# Patient Record
Sex: FEMALE | Race: WHITE | NOT HISPANIC OR LATINO | Employment: STUDENT | URBAN - METROPOLITAN AREA
[De-identification: names, ages, dates, MRNs, and addresses within clinical notes are randomized per-mention and may not be internally consistent; named-entity substitution may affect disease eponyms.]

---
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
## 2023-01-06 ENCOUNTER — OFFICE VISIT (OUTPATIENT)
Dept: GASTROENTEROLOGY | Facility: CLINIC | Age: 18
End: 2023-01-06
Payer: COMMERCIAL

## 2023-01-06 VITALS
TEMPERATURE: 97.5 F | HEART RATE: 92 BPM | BODY MASS INDEX: 39.43 KG/M2 | HEIGHT: 68 IN | DIASTOLIC BLOOD PRESSURE: 83 MMHG | SYSTOLIC BLOOD PRESSURE: 119 MMHG | WEIGHT: 260.2 LBS

## 2023-01-06 DIAGNOSIS — K21.9 GASTROESOPHAGEAL REFLUX DISEASE, UNSPECIFIED WHETHER ESOPHAGITIS PRESENT: ICD-10-CM

## 2023-01-06 DIAGNOSIS — K58.8 OTHER IRRITABLE BOWEL SYNDROME: ICD-10-CM

## 2023-01-06 DIAGNOSIS — K31.84 GASTROPARESIS: Primary | ICD-10-CM

## 2023-01-06 PROCEDURE — 99214 OFFICE O/P EST MOD 30 MIN: CPT | Performed by: NURSE PRACTITIONER

## 2023-01-06 RX ORDER — ONDANSETRON 4 MG/1
4 TABLET, FILM COATED ORAL EVERY 8 HOURS PRN
Qty: 25 TABLET | Refills: 3 | Status: SHIPPED | OUTPATIENT
Start: 2023-01-06

## 2023-01-06 RX ORDER — AZITHROMYCIN 200 MG/5ML
200 POWDER, FOR SUSPENSION ORAL DAILY
Qty: 150 ML | Refills: 11 | Status: SHIPPED | OUTPATIENT
Start: 2023-01-06

## 2023-01-06 RX ORDER — ZONISAMIDE 100 MG/1
200 CAPSULE ORAL DAILY
COMMUNITY
Start: 2022-12-23 | End: 2023-02-01

## 2023-01-06 NOTE — PROGRESS NOTES
Chief Complaint   Patient presents with   • New diagnosis of gastroparesis       HPI    Wendy Marks is a  17 y.o. female here for a follow up visit for abdominal pain and nausea.    This patient follows with Dr. Tapia and myself.    History of irritable bowel syndrome and GERD.  Gastric emptying study abnormal with increased gastric retention at 2 hours, 3 hours and 4 hours.  4-hour retention measuring 38%.    Today she has been meeting with a dietitian and working on gastroparesis diet which has helped.  She still has residual abdominal discomfort, gas, bloating, belching, and feelings of early satiety which are worse in the late evening.  She is taking omeprazole 40 mg once daily and feels like this helps control her heartburn.  No dysphagia or odynophagia.    Denies diarrhea, constipation, rectal bleeding.    Past Medical History:   Diagnosis Date   • Anemia    • Ankylosing spondylitis (HCC)    • Anxiety    • Chronic constipation    • Chronic diarrhea    • Chronic low back pain    • COVID-19 Jan 21   • Depression    • Endometriosis    • GERD (gastroesophageal reflux disease)    • History of anemia    • Hyperlipidemia    • IBS (irritable bowel syndrome)    • Migraine     with aura   • PCOS (polycystic ovarian syndrome)    • PONV (postoperative nausea and vomiting)        Past Surgical History:   Procedure Laterality Date   • ABDOMINAL SURGERY     • APPENDECTOMY     • EPIDURAL Bilateral 04/21/2021    Procedure: LUMBAR/SACRAL TRANSFORAMINAL EPIDURAL - likely bilateral at L5;  Surgeon: Rhett Gutierrez MD;  Location: [facility] MAIN OR;  Service: Pain Management;  Laterality: Bilateral;   • EPIDURAL Bilateral 12/10/2021    Procedure: Bilateral L5 LUMBAR/SACRAL TRANSFORAMINAL EPIDURAL;  Surgeon: Rhett Gutierrez MD;  Location: [facility] MAIN OR;  Service: Pain Management;  Laterality: Bilateral;   • EPIDURAL Bilateral 07/20/2022    Procedure: Bilateral L5 LUMBAR/SACRAL TRANSFORAMINAL EPIDURAL;  Surgeon: Brenda  Rhett JUDGE MD;  Location: [facility] MAIN OR;  Service: Pain Management;  Laterality: Bilateral;   • EPIDURAL Bilateral 12/01/2022    Procedure: LUMBAR/SACRAL TRANSFORAMINAL EPIDURAL BILAT L5--THERAPEUTIC;  Surgeon: Rhett Gutierrez MD;  Location: [facility] MAIN OR;  Service: Pain Management;  Laterality: Bilateral;   • GYNECOLOGY EXAM UNDER ANESTHESIA N/A 03/09/2021    Procedure: GYNECOLOGY EXAM UNDER ANESTHESIAl, removal of Mirena intrauterine device and placement of Kyleena intrauterine device;  Surgeon: Josephine Cummins MD;  Location: [facility] OR;  Service: Obstetrics/Gynecology;  Laterality: N/A;  GYNECOLOGY EXAM UNDER ANESTHESIA  REMOVAL OF MIRENA INTRAUTERINE DEVICE AND PLACEMENT OF KYLEENA INTRAUTERINE DEVICE   • MEDIAL BRANCH BLOCK Bilateral 06/09/2022    Procedure: Bilateral L4-S1 MEDIAL BRANCH BLOCK;  Surgeon: Rhett Gutierrez MD;  Location: [facility] MAIN OR;  Service: Pain Management;  Laterality: Bilateral;   • PELVIC LAPAROSCOPY      dx lsc endometriosis       Scheduled Meds:     Continuous Infusions: No current facility-administered medications for this visit.      PRN Meds:     No Known Allergies    Social History     Socioeconomic History   • Marital status: Single   Tobacco Use   • Smoking status: Never   • Smokeless tobacco: Never   Vaping Use   • Vaping Use: Never used   Substance and Sexual Activity   • Alcohol use: Never   • Drug use: Not Currently     Types: Marijuana     Comment: 1-2 times monthly   • Sexual activity: Never     Birth control/protection: I.U.D.     Comment: Mirena 12/2019       Family History   Problem Relation Age of Onset   • Polycystic ovary syndrome Maternal Aunt    • Hyperthyroidism Maternal Aunt    • Liver disease Maternal Aunt    • Irritable bowel syndrome Mother    • Depression Mother    • Diabetes Father    • Pancreatitis Father    • Hyperthyroidism Sister    • Skin cancer Sister    • Colon polyps Sister    • Skin cancer Maternal Uncle    • Diabetes Maternal Grandfather     • Diabetes Paternal Grandfather    • Leukemia Paternal Grandfather    • Breast cancer Neg Hx    • Ovarian cancer Neg Hx    • Colon cancer Neg Hx    • Deep vein thrombosis Neg Hx    • Pulmonary embolism Neg Hx    • Malig Hyperthermia Neg Hx        Review of Systems   Constitutional: Negative for activity change, appetite change, fatigue, fever and unexpected weight change.   HENT: Negative for trouble swallowing.    Respiratory: Negative for apnea, cough, choking, chest tightness, shortness of breath and wheezing.    Cardiovascular: Negative for chest pain, palpitations and leg swelling.   Gastrointestinal: Positive for nausea. Negative for abdominal distention, abdominal pain, anal bleeding, blood in stool, constipation, diarrhea, rectal pain and vomiting.       Vitals:    01/06/23 1126   BP: 119/83   Pulse: 92   Temp: 97.5 °F (36.4 °C)       Physical Exam  Constitutional:       Appearance: She is well-developed.   Abdominal:      General: Bowel sounds are normal. There is no distension.      Palpations: Abdomen is soft. There is no mass.      Tenderness: There is no abdominal tenderness. There is no guarding.      Hernia: No hernia is present.   Skin:     General: Skin is warm and dry.      Capillary Refill: Capillary refill takes less than 2 seconds.   Neurological:      Mental Status: She is alert and oriented to person, place, and time.   Psychiatric:         Behavior: Behavior normal.     Assessment    Diagnoses and all orders for this visit:    1. Gastroparesis (Primary)    2. Gastroesophageal reflux disease, unspecified whether esophagitis present    3. Other irritable bowel syndrome    Other orders  -     azithromycin (Zithromax) 200 MG/5ML suspension; Take 5 mL by mouth Daily. Treatment for gastroparesis  Dispense: 150 mL; Refill: 11  -     ondansetron (ZOFRAN) 4 MG tablet; Take 1 tablet by mouth Every 8 (Eight) Hours As Needed for Nausea or Vomiting.  Dispense: 25 tablet; Refill: 3        Plan    Pleasant 17-year-old female seen today for continued nausea and early satiety with some improvement on gastroparesis diet after gastric emptying study was found to be abnormal.  We discussed her GES results and all questions were answered.      Recommend she continue PPI therapy and dietary modifications as well as continue to follow along with nutritional services.  We discussed trial of azithromycin to help with digestion and she is amendable.  Continue as needed Zofran.    Additionally recommend SIBO breath testing as patients with gastroparesis are prone to bacterial imbalances.      RTC 3 months or sooner if needed.          LUIS Malave  [facility]  [address] [place] [address]  Office: [phone]

## 2023-01-10 ENCOUNTER — TELEPHONE (OUTPATIENT)
Dept: GASTROENTEROLOGY | Facility: CLINIC | Age: 18
End: 2023-01-10
Payer: COMMERCIAL

## 2023-01-10 NOTE — TELEPHONE ENCOUNTER
----- Message from LUIS Malave sent at 1/6/2023 12:00 PM EST -----  Needs SIBO breath test kit mailed to her home

## 2023-01-23 ENCOUNTER — HOSPITAL ENCOUNTER (EMERGENCY)
Facility: HOSPITAL | Age: 18
Discharge: HOME | End: 2023-01-23
Attending: EMERGENCY MEDICINE | Admitting: EMERGENCY MEDICINE
Payer: COMMERCIAL

## 2023-01-23 VITALS
RESPIRATION RATE: 20 BRPM | WEIGHT: 250 LBS | OXYGEN SATURATION: 97 % | DIASTOLIC BLOOD PRESSURE: 103 MMHG | TEMPERATURE: 98.6 F | BODY MASS INDEX: 37.89 KG/M2 | HEART RATE: 95 BPM | HEIGHT: 68 IN | SYSTOLIC BLOOD PRESSURE: 140 MMHG

## 2023-01-23 DIAGNOSIS — R20.2 PARESTHESIA: Primary | ICD-10-CM

## 2023-01-23 LAB
ALBUMIN SERPL-MCNC: 4.5 G/DL (ref 3.5–5.2)
ALBUMIN/GLOB SERPL: 1.4 G/DL
ALP SERPL-CCNC: 70 U/L (ref 39–117)
ALT SERPL W P-5'-P-CCNC: 19 U/L (ref 1–33)
ANION GAP SERPL CALCULATED.3IONS-SCNC: 11 MMOL/L (ref 5–15)
AST SERPL-CCNC: 18 U/L (ref 1–32)
BASOPHILS # BLD AUTO: 0.04 10*3/MM3 (ref 0–0.2)
BASOPHILS NFR BLD AUTO: 0.5 % (ref 0–1.5)
BILIRUB SERPL-MCNC: 0.2 MG/DL (ref 0–1.2)
BUN SERPL-MCNC: 8 MG/DL (ref 6–20)
BUN/CREAT SERPL: 11.6 (ref 7–25)
CALCIUM SPEC-SCNC: 9.6 MG/DL (ref 8.6–10.5)
CHLORIDE SERPL-SCNC: 105 MMOL/L (ref 98–107)
CO2 SERPL-SCNC: 20 MMOL/L (ref 22–29)
CREAT SERPL-MCNC: 0.69 MG/DL (ref 0.57–1)
DEPRECATED RDW RBC AUTO: 42.2 FL (ref 37–54)
EGFRCR SERPLBLD CKD-EPI 2021: 128.4 ML/MIN/1.73
EOSINOPHIL # BLD AUTO: 0.07 10*3/MM3 (ref 0–0.4)
EOSINOPHIL NFR BLD AUTO: 0.9 % (ref 0.3–6.2)
ERYTHROCYTE [DISTWIDTH] IN BLOOD BY AUTOMATED COUNT: 13 % (ref 12.3–15.4)
GLOBULIN UR ELPH-MCNC: 3.3 GM/DL
GLUCOSE SERPL-MCNC: 112 MG/DL (ref 65–99)
HCT VFR BLD AUTO: 44.4 % (ref 34–46.6)
HGB BLD-MCNC: 14.7 G/DL (ref 12–15.9)
IMM GRANULOCYTES # BLD AUTO: 0.03 10*3/MM3 (ref 0–0.05)
IMM GRANULOCYTES NFR BLD AUTO: 0.4 % (ref 0–0.5)
LYMPHOCYTES # BLD AUTO: 2.69 10*3/MM3 (ref 0.7–3.1)
LYMPHOCYTES NFR BLD AUTO: 35.1 % (ref 19.6–45.3)
MCH RBC QN AUTO: 29.4 PG (ref 26.6–33)
MCHC RBC AUTO-ENTMCNC: 33.1 G/DL (ref 31.5–35.7)
MCV RBC AUTO: 88.8 FL (ref 79–97)
MONOCYTES # BLD AUTO: 0.3 10*3/MM3 (ref 0.1–0.9)
MONOCYTES NFR BLD AUTO: 3.9 % (ref 5–12)
NEUTROPHILS NFR BLD AUTO: 4.53 10*3/MM3 (ref 1.7–7)
NEUTROPHILS NFR BLD AUTO: 59.2 % (ref 42.7–76)
NRBC BLD AUTO-RTO: 0 /100 WBC (ref 0–0.2)
PLATELET # BLD AUTO: 358 10*3/MM3 (ref 140–450)
PMV BLD AUTO: 10.3 FL (ref 6–12)
POTASSIUM SERPL-SCNC: 4.5 MMOL/L (ref 3.5–5.2)
PROT SERPL-MCNC: 7.8 G/DL (ref 6–8.5)
RBC # BLD AUTO: 5 10*6/MM3 (ref 3.77–5.28)
SODIUM SERPL-SCNC: 136 MMOL/L (ref 136–145)
WBC NRBC COR # BLD: 7.66 10*3/MM3 (ref 3.4–10.8)

## 2023-01-23 PROCEDURE — 85025 COMPLETE CBC W/AUTO DIFF WBC: CPT | Performed by: PHYSICIAN ASSISTANT

## 2023-01-23 PROCEDURE — 99283 EMERGENCY DEPT VISIT LOW MDM: CPT

## 2023-01-23 PROCEDURE — 80053 COMPREHEN METABOLIC PANEL: CPT | Performed by: PHYSICIAN ASSISTANT

## 2023-01-23 RX ORDER — SODIUM CHLORIDE 0.9 % (FLUSH) 0.9 %
10 SYRINGE (ML) INJECTION AS NEEDED
Status: DISCONTINUED | OUTPATIENT
Start: 2023-01-23 | End: 2023-01-23 | Stop reason: HOSPADM

## 2023-01-23 NOTE — ED PROVIDER NOTES
EMERGENCY DEPARTMENT ENCOUNTER    Room Number:  01/01  Date of encounter:  1/23/2023  PCP: Gerson Neville MD  Historian: Patient  Full history not obtainable due to: None    HPI:  Chief Complaint: paresthesias    Context: Wendy Marks is a 17 y.o. female with a PMH significant for migraine, IBS, anxiety, lumbar radiculopathy, chronic pain syndrome who presents to the ED c/o paresthesias. Patient states she woke up this morning around 4am with symptoms of tingling around her eyes. When she went back to sleep and woke up, she noticed tingling in bilateral arms and legs. She describes it as pins and needles. Patient says the tingling is constant. She took a sumatriptan with no improvement. Patient has had similar symptoms in the past on her face, but never in her arms or legs. She took dexamethasone last night because she was going to have blood work done to check for Cushing's this morning. Denies any nausea, vomiting, numbness, chest pain, palpitations, shortness of breath, recent injury, headache, recent illness, or fever.      MEDICAL RECORD REVIEW:    Upon further review of past medical records, patient was most recently evaluated by GI for gastroparesis, GERD, and IBS on 1/6/2023.    PAST MEDICAL HISTORY    Active Ambulatory Problems     Diagnosis Date Noted   • Migraine    • IBS (irritable bowel syndrome)    • Ankylosing spondylitis (HCC)    • Lumbar radiculopathy 04/13/2021   • Insulin resistance 01/27/2022   • Facet syndrome, lumbar 05/24/2022   • DDD (degenerative disc disease), lumbar 08/17/2022   • Chronic pain syndrome 08/17/2022     Resolved Ambulatory Problems     Diagnosis Date Noted   • Malpositioned IUD, sequela 03/04/2021   • Encounter for IUD insertion 03/04/2021   • Encounter for removal and reinsertion of intrauterine contraceptive device (IUD) 03/09/2021     Past Medical History:   Diagnosis Date   • Anemia    • Anxiety    • Chronic constipation    • Chronic diarrhea    • Chronic low back  pain    • COVID-19    • Depression    • Endometriosis    • GERD (gastroesophageal reflux disease)    • History of anemia    • Hyperlipidemia    • PCOS (polycystic ovarian syndrome)    • PONV (postoperative nausea and vomiting)          PAST SURGICAL HISTORY  Past Surgical History:   Procedure Laterality Date   • ABDOMINAL SURGERY     • APPENDECTOMY     • EPIDURAL Bilateral 04/21/2021    Procedure: LUMBAR/SACRAL TRANSFORAMINAL EPIDURAL - likely bilateral at L5;  Surgeon: Rhett Gutierrez MD;  [place] [facility] [place];  Service: Pain Management;  Laterality: Bilateral;   • EPIDURAL Bilateral 12/10/2021    Procedure: Bilateral L5 LUMBAR/SACRAL TRANSFORAMINAL EPIDURAL;  Surgeon: Rhett Gutierrez MD;  [place] [facility] [place];  Service: Pain Management;  Laterality: Bilateral;   • EPIDURAL Bilateral 07/20/2022    Procedure: Bilateral L5 LUMBAR/SACRAL TRANSFORAMINAL EPIDURAL;  Surgeon: Rhett Gutierrez MD;  [place] [facility] [place];  Service: Pain Management;  Laterality: Bilateral;   • EPIDURAL Bilateral 12/01/2022    Procedure: LUMBAR/SACRAL TRANSFORAMINAL EPIDURAL BILAT L5--THERAPEUTIC;  Surgeon: Rhett Gutierrez MD;  [place] [facility] [place];  Service: Pain Management;  Laterality: Bilateral;   • GYNECOLOGY EXAM UNDER ANESTHESIA N/A 03/09/2021    Procedure: GYNECOLOGY EXAM UNDER ANESTHESIAl, removal of Mirena intrauterine device and placement of Kyleena intrauterine device;  Surgeon: Josephine Cummins MD;  Location[facility] OR;  Service: Obstetrics/Gynecology;  Laterality: N/A;  GYNECOLOGY EXAM UNDER ANESTHESIA  REMOVAL OF MIRENA INTRAUTERINE DEVICE AND PLACEMENT OF KYLEENA INTRAUTERINE DEVICE   • MEDIAL BRANCH BLOCK Bilateral 06/09/2022    Procedure: Bilateral L4-S1 MEDIAL BRANCH BLOCK;  Surgeon: Rhett Gutierrez MD;  [place] [facility] [place] [facility] [place];  Service: Pain Management;  Laterality: Bilateral;   • PELVIC LAPAROSCOPY      dx lsc endometriosis         FAMILY HISTORY  Family History   Problem Relation Age of  Onset   • Polycystic ovary syndrome Maternal Aunt    • Hyperthyroidism Maternal Aunt    • Liver disease Maternal Aunt    • Irritable bowel syndrome Mother    • Depression Mother    • Diabetes Father    • Pancreatitis Father    • Hyperthyroidism Sister    • Skin cancer Sister    • Colon polyps Sister    • Skin cancer Maternal Uncle    • Diabetes Maternal Grandfather    • Diabetes Paternal Grandfather    • Leukemia Paternal Grandfather    • Breast cancer Neg Hx    • Ovarian cancer Neg Hx    • Colon cancer Neg Hx    • Deep vein thrombosis Neg Hx    • Pulmonary embolism Neg Hx    • Malig Hyperthermia Neg Hx          SOCIAL HISTORY  Social History     Socioeconomic History   • Marital status: Single   Tobacco Use   • Smoking status: Never   • Smokeless tobacco: Never   Vaping Use   • Vaping Use: Never used   Substance and Sexual Activity   • Alcohol use: Never   • Drug use: Not Currently     Types: Marijuana     Comment: 1-2 times monthly   • Sexual activity: Never     Birth control/protection: I.U.D.     Comment: Mirena 12/2019         ALLERGIES  Adhesive tape and Latex        REVIEW OF SYSTEMS    All systems reviewed and marked as negative except as listed in HPI     PHYSICAL EXAM    I have reviewed the triage vital signs and nursing notes.    ED Triage Vitals   Temp Heart Rate Resp BP SpO2   01/23/23 0555 01/23/23 0555 01/23/23 0555 01/23/23 0606 01/23/23 0555   98.6 °F (37 °C) 111 20 134/100 98 %      Temp src Heart Rate Source Patient Position BP Location FiO2 (%)   01/23/23 0555 01/23/23 0555 01/23/23 0612 01/23/23 0612 --   Tympanic Monitor Lying Right arm        Physical Exam  Constitutional:       General: She is not in acute distress.     Appearance: She is overweight.   HENT:      Head: Normocephalic and atraumatic.   Eyes:      General: No scleral icterus.     Conjunctiva/sclera: Conjunctivae normal.   Neck:      Trachea: No tracheal deviation.   Cardiovascular:      Rate and Rhythm: Normal rate and regular  rhythm.   Pulmonary:      Effort: Pulmonary effort is normal.      Breath sounds: Normal breath sounds.   Abdominal:      Palpations: Abdomen is soft.      Tenderness: There is no abdominal tenderness.   Musculoskeletal:         General: No deformity.      Cervical back: Normal range of motion.   Lymphadenopathy:      Cervical: No cervical adenopathy.   Skin:     General: Skin is warm and dry.   Neurological:      Mental Status: She is alert and oriented to person, place, and time.      Cranial Nerves: Cranial nerves 2-12 are intact.      Sensory: Sensation is intact.      Motor: Motor function is intact.      Coordination: Coordination is intact.      Gait: Gait is intact.   Psychiatric:         Behavior: Behavior normal.         Vital signs and nursing notes reviewed.            LAB RESULTS  No results found for this or any previous visit (from the past 24 hour(s)).    Ordered the above labs and independently reviewed the results.        RADIOLOGY  No Radiology Exams Resulted Within Past 24 Hours    I ordered the above noted radiological studies. Independently reviewed by me and discussed with radiologist.  See dictation above for official radiology interpretation.      PROCEDURES    Procedures        MEDICATIONS GIVEN IN ER    Medications - No data to display      PROGRESS, DATA ANALYSIS, CONSULTS, AND MEDICAL DECISION MAKING    All labs have been independently reviewed by me.  All radiology studies have been reviewed by me.   EKG's independently reviewed by me.  Discussion below represents my analysis of pertinent findings related to patient's condition, differential diagnosis, treatment plan and final disposition.    - Chronic or social conditions impacting care: Anxiety      DIFFERENTIAL DIAGNOSIS INCLUDE BUT NOT LIMITED TO:     Anxiety, electrolyte disturbance, vertebral radiculopathy      Orders placed during this visit:  No orders of the defined types were placed in this encounter.             AS OF 06:23  EST VITALS:    BP - 134/100  HR - 94  TEMP - 98.6 °F (37 °C) (Tympanic)  02 SATS - 98%    I rechecked the patient.  I discussed the patient's labs, diagnosis, and plan for discharge.  A repeat exam reveals no new worrisome changes from my initial exam findings.  The patient understands that the fact that they are being discharged does not denote that nothing is abnormal, it indicates that no clinical emergency is present and that they must follow-up as directed in order to properly maintain their health.  Follow-up instructions (specifically listed below) and return to ER precautions were given at this time.  I specifically instructed the patient to follow-up with their PCP.  The patient understands and agrees with the plan, and is ready for d/c.  All questions answered.        DIAGNOSIS  Final diagnoses:   Paresthesia         DISPOSITION  D/c    Pt masked in first look. I wore a surgical mask throughout my encounters with the pt. I performed hand hygiene on entry into the pt room and upon exit.     Dictated utilizing Dragon dictation     Note Disclaimer: At [facility], we believe that sharing information builds trust and better relationships. You are receiving this note because you recently visited [facility]. It is possible you will see health information before a provider has talked with you about it. This kind of information can be easy to misunderstand. To help you fully understand what it means for your health, we urge you to discuss this note with your provider.      Wilman Shen PA  01/26/23 204

## 2023-01-23 NOTE — ED TRIAGE NOTES
"Pt presents to the ER from home via pvt car with c/o tingling that started around both of her eyes when she woke up this am at approx 0300 that has spread to her whole face, both arms, and both legs.  Pt describes tingling as \"electric shocks\" and states that it feels like she has ants or bugs crawling under her skin. Pt shows no s/s of distress at this time.  Pt was ambulatory to triage from the parking lot and to her ED room independently.  No LOB noted, and she has a steady gait.       This RN in appropriate PPE for all patient care interactions. Pt masked and redirected for proper mask use when necessary. Hand hygiene performed before and after all patient care interactions.  "

## 2023-02-01 ENCOUNTER — PATIENT MESSAGE (OUTPATIENT)
Dept: GASTROENTEROLOGY | Facility: CLINIC | Age: 18
End: 2023-02-01
Payer: COMMERCIAL

## 2023-02-01 ENCOUNTER — OFFICE VISIT (OUTPATIENT)
Dept: PAIN MEDICINE | Facility: CLINIC | Age: 18
End: 2023-02-01
Payer: COMMERCIAL

## 2023-02-01 ENCOUNTER — PREP FOR SURGERY (OUTPATIENT)
Dept: SURGERY | Facility: SURGERY CENTER | Age: 18
End: 2023-02-01
Payer: COMMERCIAL

## 2023-02-01 VITALS
WEIGHT: 262 LBS | TEMPERATURE: 98.4 F | BODY MASS INDEX: 39.71 KG/M2 | HEART RATE: 112 BPM | OXYGEN SATURATION: 98 % | DIASTOLIC BLOOD PRESSURE: 79 MMHG | SYSTOLIC BLOOD PRESSURE: 138 MMHG | RESPIRATION RATE: 18 BRPM | HEIGHT: 68 IN

## 2023-02-01 DIAGNOSIS — K21.9 GASTROESOPHAGEAL REFLUX DISEASE, UNSPECIFIED WHETHER ESOPHAGITIS PRESENT: ICD-10-CM

## 2023-02-01 DIAGNOSIS — K31.84 GASTROPARESIS: Primary | ICD-10-CM

## 2023-02-01 DIAGNOSIS — M54.16 LUMBAR RADICULOPATHY: Primary | ICD-10-CM

## 2023-02-01 DIAGNOSIS — M47.816 FACET SYNDROME, LUMBAR: ICD-10-CM

## 2023-02-01 DIAGNOSIS — R10.11 RIGHT UPPER QUADRANT PAIN: ICD-10-CM

## 2023-02-01 DIAGNOSIS — R11.0 NAUSEA: ICD-10-CM

## 2023-02-01 DIAGNOSIS — M51.36 DDD (DEGENERATIVE DISC DISEASE), LUMBAR: ICD-10-CM

## 2023-02-01 PROCEDURE — 99214 OFFICE O/P EST MOD 30 MIN: CPT | Performed by: PHYSICIAN ASSISTANT

## 2023-02-01 RX ORDER — DULOXETIN HYDROCHLORIDE 30 MG/1
CAPSULE, DELAYED RELEASE ORAL
COMMUNITY

## 2023-02-01 RX ORDER — PREGABALIN 75 MG/1
75 CAPSULE ORAL 3 TIMES DAILY
Qty: 90 CAPSULE | Refills: 0 | Status: SHIPPED | OUTPATIENT
Start: 2023-02-01 | End: 2023-02-27

## 2023-02-01 RX ORDER — SODIUM CHLORIDE 0.9 % (FLUSH) 0.9 %
10 SYRINGE (ML) INJECTION AS NEEDED
Status: CANCELLED | OUTPATIENT
Start: 2023-02-01

## 2023-02-01 RX ORDER — SODIUM CHLORIDE 0.9 % (FLUSH) 0.9 %
10 SYRINGE (ML) INJECTION EVERY 12 HOURS SCHEDULED
Status: CANCELLED | OUTPATIENT
Start: 2023-02-01

## 2023-02-01 RX ORDER — FREMANEZUMAB-VFRM 225 MG/1.5ML
INJECTION SUBCUTANEOUS
COMMUNITY
Start: 2023-01-11

## 2023-02-01 RX ORDER — SEMAGLUTIDE 1.34 MG/ML
INJECTION, SOLUTION SUBCUTANEOUS
COMMUNITY

## 2023-02-01 RX ORDER — ETODOLAC 400 MG/1
400 TABLET, FILM COATED ORAL 2 TIMES DAILY
Qty: 60 TABLET | Refills: 0 | Status: SHIPPED | OUTPATIENT
Start: 2023-02-01 | End: 2023-02-27

## 2023-02-01 NOTE — PROGRESS NOTES
CHIEF COMPLAINT    Follow up - back pain. Pt states pain is worse since last ov. Patient has been diagnosed with gastroparesis since last ov.    Jakob Marks is a 17 y.o. female  who presents for follow-up.  She has a history of low back and bilateral lower extremity pain.  The patient is noted progressive worsening of pain as compared to her last office visit.  Patient illustrates pain in a transverse distribution across lumbosacral spine radiating to the bilateral buttocks/hips and into the posterior aspects of the bilateral lower extremities.  Patient is also noted progressive worsening of abdominal/pelvic pain secondary to new diagnosis of gastroparesis as well as endometriosis.    Medical history significant for HLA-B 27 which is managed by rheumatologist Dr. Trent.    Patient is currently using gabapentin 600 mg 3 times daily for chronic pelvic and low back pain however she has noted significant decreased efficacy of the medication over the last 2 months.  She also continues with OTC Tylenol/ibuprofen and tizanidine 4 mg nightly.    Pain today 6/10 VAS in severity.      Back Pain  This is a chronic problem. The current episode started more than 1 year ago. The problem occurs constantly. The problem is unchanged. The pain is present in the lumbar spine. The quality of the pain is described as aching, burning and shooting. Radiates to: BLEs. The pain is at a severity of 6/10. The pain is moderate. The pain is the same all the time. The symptoms are aggravated by standing and position (walking). Associated symptoms include leg pain and numbness. Pertinent negatives include no chest pain or fever.        PEG Assessment   What number best describes your pain on average in the past week?7  What number best describes how, during the past week, pain has interfered with your enjoyment of life?6  What number best describes how, during the past week, pain has interfered with your general activity?   6    Review of Pertinent Medical Data ---    MRI OF THE LUMBAR SPINE WITHOUT CONTRAST 11/09/2022     CLINICAL HISTORY: Patient has low back pain, lumbar radiculopathy,  weakness in lower extremities.      TECHNIQUE: Sagittal T1, proton density and fat-suppressed T2-weighted  images were obtained of the lumbar spine.  In addition axial T2-weighted  images were obtained from L1 to S2 and thin cut axial T1-weighted images  were obtained from L1 to L3 then angled through the interspaces from L3  to S1.     FINDINGS: The distal thoracic cord and the conus is normal in signal  intensity.  The conus terminates at the superior body of the L1 lumbar  level which is normal.      At T12-L1 the disc space and facets are normal in appearance with no  canal or foraminal narrowing.      At L1-2 the disc space and facets are normal in appearance with no canal  or foraminal narrowing.     At L2-3 there is very minimal posterior disc bulge.  The facets are  normal.  There is prominent posterior epidural fat and is predominantly  the posterior right and left lateral epidural fat that wraps along the  margins of the thecal sac resulting in mild-to-moderate generalized  narrowing of the thecal sac with slight diminished spinal fluid bathing  the cauda equina at this level and there is no foraminal narrowing.      At L3-4 the disc space is normal.  There is mild facet overgrowth.   There is prominent epidural fat.  There is mild generalized narrowing of  the thecal sac.  There is no foraminal narrowing.      At L4-5 there is mild bilateral facet overgrowth.  There is posterior  protruding disc material that indents the ventral aspect of the thecal  sac that mildly narrows the thecal sac and may contact the ventral  aspect of the traversing L5 nerve roots.  There is no foraminal  narrowing.      At L5-S1 there is minimal if any facet overgrowth, a 2 mm retrolisthesis  of L5 with respect to S1.  There is a small left posterior  lateral  subarticular disc protrusion that abuts the ventral aspect of the  traversing left S1 nerve root but does not appear to displace or  compress it. There is no significant canal or lateral recess or right  foraminal narrowing.  There is mild left foraminal narrowing.     IMPRESSION:  1. The MRI images obtained are somewhat grainy and are decreased  resolution and this limits evaluation of the lumbar spine.  2. Patient has a somewhat small size lumbar spinal canal and has  prominent posterior epidural fat at several levels that contributes to  narrowing of the thecal sac.  3. At L2-3 there is a minimal posterior disc bulge and prominent  posterior and right and left lateral epidural fat that wraps along the  margins of the thecal sac and there is mild-to-moderate generalized  narrowing of the thecal sac and slight diminished spinal fluid bathing  the cauda equina at this level.  4. At L3-4 there is prominent posterior right and left lateral epidural  fat and mild generalized narrowing of the thecal sac.  5. At L4-5 there is mild bilateral facet overgrowth.  There is a  posterior disc protrusion that indents the ventral aspect of the thecal  sac that mildly narrows the thecal sac and abuts the ventral aspect of  the traversing L5 nerve roots.  6. At L5-S1 there is mild disc space narrowing and disc desiccation, 2-3  mm retrolisthesis of L5 with respect to S1 with uncovering of the  posterior inferior aspect of the L5-S1 disc space and there is a left  posterior lateral-subarticular small disc protrusion that abuts the  ventral aspect of the traversing left S1 nerve root but does not  displace it.  There is minimal spurring and bulging disc material into  the left foramen with mild left foraminal narrowing.  The remainder of  the lumbar spine MRI is unremarkable.     Caveat: Due to the graininess of the images and the limited evaluation  on this MRI of the lumbar spine, If the patient has a persistent  "lumbar  radiculopathy consider a myelogram and postmyelogram CT for a more  comprehensive assessment.     This report was finalized on 11/11/2022 8:16 AM by Dr. Dat Ferguson M.D.    The following portions of the patient's history were reviewed and updated as appropriate: allergies, current medications, past family history, past medical history, past social history, past surgical history and problem list.    Review of Systems   Constitutional: Negative for fever.   HENT: Negative for congestion.    Eyes: Negative for visual disturbance.   Respiratory: Negative for shortness of breath.    Cardiovascular: Negative for chest pain.   Gastrointestinal: Negative for constipation and diarrhea.   Genitourinary: Negative for difficulty urinating and dyspareunia.   Musculoskeletal: Positive for back pain and joint swelling.   Neurological: Positive for numbness.   Psychiatric/Behavioral: Positive for sleep disturbance. Negative for suicidal ideas.     I have reviewed and confirmed the accuracy of the ROS as documented by the MA/LPN/RN GEOVANY Garcia    Vitals:    02/01/23 0944   BP: 138/79   Pulse: 112   Resp: 18   Temp: 98.4 °F (36.9 °C)   SpO2: 98%   Weight: 119 kg (262 lb)   Height: 172.7 cm (67.99\")   PainSc:   6   PainLoc: Back         Objective   Physical Exam  Vitals and nursing note reviewed.   Constitutional:       Appearance: Normal appearance.   HENT:      Head: Normocephalic.   Pulmonary:      Effort: Pulmonary effort is normal.   Musculoskeletal:      Lumbar back: Spasms and tenderness present. Decreased range of motion. Positive right straight leg raise test.   Skin:     General: Skin is warm and dry.   Neurological:      General: No focal deficit present.      Mental Status: She is alert and oriented to person, place, and time.      Cranial Nerves: Cranial nerves 2-12 are intact.      Sensory: Sensation is intact.      Motor: Motor function is intact.      Gait: Gait is intact.   Psychiatric:         Mood " and Affect: Mood normal.         Behavior: Behavior normal.         Thought Content: Thought content normal.         Judgment: Judgment normal.         Assessment & Plan   Diagnoses and all orders for this visit:    1. Lumbar radiculopathy (Primary)  -     pregabalin (LYRICA) 75 MG capsule; Take 1 capsule by mouth 3 (Three) Times a Day.  Dispense: 90 capsule; Refill: 0    2. DDD (degenerative disc disease), lumbar    3. Facet syndrome, lumbar  -     etodolac (LODINE) 400 MG tablet; Take 1 tablet by mouth 2 (Two) Times a Day.  Dispense: 60 tablet; Refill: 0        --- I will have the patient return for therapeutic bilateral L5 TFESI for progressive worsening of discogenic and lower extremity radicular pain  --- Future consideration would be bilateral L3-5 IA facet joint injection with steroid for persistent axial low back pain.  Patient has failed to respond to previous bilateral L3-5 MBB  --- Patient will decrease gabapentin to 600 mg twice daily x5 days and then rotate to pregabalin 75 mg 3 times daily.  --- I will also have her discontinue OTC NSAID and we will institute a trial of etodolac 400 mg twice daily  --- RTC in 4 weeks for evaluation of pregabalin trial           SHARIF REPORT  As part of the patient's treatment plan, I am prescribing controlled substances. The patient has been made aware of appropriate use of such medications, including potential risk of somnolence, limited ability to drive and/or work safely, and the potential for dependence or overdose. It has also been made clear that these medications are for use by this patient only, without concomitant use of alcohol or other substances unless prescribed.     Patient has completed prescribing agreement detailing terms of continued prescribing of controlled substances, including monitoring SHARIF reports, urine drug screening, and pill counts if necessary. The patient is aware that inappropriate use will results in cessation of prescribing such  medications.    As the clinician, I personally reviewed the SHARIF from 2/1/2023 while the patient was in the office today.    History and physical exam exhibit continued safe and appropriate use of controlled substances.     Dictated utilizing Dragon dictation.       Patient remained masked during entire encounter. No cough present. I donned a mask and eye protection throughout entire visit. Prior to donning mask and eye protection, hand hygiene was performed, as well as when it was doffed.  I was closer than 6 feet, but not for an extended period of time. No obvious exposure to any bodily fluids.

## 2023-02-01 NOTE — TELEPHONE ENCOUNTER
Treatment for gastroparesis with narcotics is contraindicated as it will worsen the condition. Is she still on azithromycin and Zofran along with PPI therapy?  Where is her pain located?

## 2023-02-02 ENCOUNTER — TRANSCRIBE ORDERS (OUTPATIENT)
Dept: SURGERY | Facility: SURGERY CENTER | Age: 18
End: 2023-02-02
Payer: COMMERCIAL

## 2023-02-02 DIAGNOSIS — Z41.9 SURGERY, ELECTIVE: Primary | ICD-10-CM

## 2023-02-03 ENCOUNTER — TELEPHONE (OUTPATIENT)
Dept: GASTROENTEROLOGY | Facility: CLINIC | Age: 18
End: 2023-02-03
Payer: COMMERCIAL

## 2023-02-03 DIAGNOSIS — K31.84 GASTROPARESIS: Primary | ICD-10-CM

## 2023-02-03 RX ORDER — OMEPRAZOLE 40 MG/1
40 CAPSULE, DELAYED RELEASE ORAL 2 TIMES DAILY
Qty: 180 CAPSULE | Refills: 1 | Status: SHIPPED | OUTPATIENT
Start: 2023-02-03

## 2023-02-03 NOTE — TELEPHONE ENCOUNTER
----- Message from LUIS Malave sent at 12/21/2022  4:06 PM EST -----  Please inform the patient her gastric emptying study is abnormal consistent with gastroparesis.  I want her to start a gastroparesis diet.  She can google this and you can send her a copy of the handout to her home.  Lets have her see the nutritionis  t here at [facility] as well.  Have her follow-up with me in 4 to 6 weeks after she sees the nutritionist.  Thanks BG

## 2023-02-03 NOTE — TELEPHONE ENCOUNTER
Pt reviewed results via [employer].     Referral to nutrition services placed.     F/u appt with Dr Tapia 4/24.

## 2023-02-06 ENCOUNTER — TELEPHONE (OUTPATIENT)
Dept: GASTROENTEROLOGY | Facility: CLINIC | Age: 18
End: 2023-02-06
Payer: COMMERCIAL

## 2023-02-15 ENCOUNTER — HOSPITAL ENCOUNTER (OUTPATIENT)
Dept: ULTRASOUND IMAGING | Facility: HOSPITAL | Age: 18
Discharge: HOME | End: 2023-02-15
Admitting: NURSE PRACTITIONER
Payer: COMMERCIAL

## 2023-02-15 DIAGNOSIS — K21.9 GASTROESOPHAGEAL REFLUX DISEASE, UNSPECIFIED WHETHER ESOPHAGITIS PRESENT: ICD-10-CM

## 2023-02-15 DIAGNOSIS — R10.11 RIGHT UPPER QUADRANT PAIN: ICD-10-CM

## 2023-02-15 DIAGNOSIS — R11.0 NAUSEA: ICD-10-CM

## 2023-02-15 DIAGNOSIS — K31.84 GASTROPARESIS: ICD-10-CM

## 2023-02-15 PROCEDURE — 76705 ECHO EXAM OF ABDOMEN: CPT

## 2023-02-17 NOTE — PROGRESS NOTES
Please inform the patient that ultrasound shows fatty liver disease without dilation of her biliary tree.  No gallstones or gallbladder wall thickening.  Treatment for fatty liver disease includes low-fat diet, increase cardiovascular exercise, and weight loss to achieve an ideal BMI over the next 3 years.  See prior message regarding treatment for positive SIBO breath test.  Results can be discussed further when she returns for follow-up.

## 2023-02-20 ENCOUNTER — TELEPHONE (OUTPATIENT)
Dept: GASTROENTEROLOGY | Facility: CLINIC | Age: 18
End: 2023-02-20
Payer: COMMERCIAL

## 2023-02-24 RX ORDER — NITAZOXANIDE 500 MG/1
500 TABLET ORAL 2 TIMES DAILY WITH MEALS
Qty: 6 TABLET | Refills: 0 | Status: SHIPPED | OUTPATIENT
Start: 2023-02-24 | End: 2023-02-27

## 2023-02-27 ENCOUNTER — HOSPITAL ENCOUNTER (OUTPATIENT)
Dept: DIABETES SERVICES | Facility: HOSPITAL | Age: 18
Discharge: HOME | End: 2023-02-27
Admitting: NURSE PRACTITIONER
Payer: COMMERCIAL

## 2023-02-27 DIAGNOSIS — M54.16 LUMBAR RADICULOPATHY: ICD-10-CM

## 2023-02-27 DIAGNOSIS — M47.816 FACET SYNDROME, LUMBAR: ICD-10-CM

## 2023-02-27 PROCEDURE — 97802 MEDICAL NUTRITION INDIV IN: CPT

## 2023-02-27 RX ORDER — PREGABALIN 100 MG/1
100 CAPSULE ORAL 3 TIMES DAILY
Qty: 90 CAPSULE | Refills: 0 | Status: SHIPPED | OUTPATIENT
Start: 2023-02-27 | End: 2023-03-27

## 2023-02-27 RX ORDER — ETODOLAC 400 MG/1
TABLET, FILM COATED ORAL
Qty: 60 TABLET | Refills: 0 | Status: SHIPPED | OUTPATIENT
Start: 2023-02-27

## 2023-02-27 NOTE — CONSULTS
Ms. Marks was seen today by Registered Dietitian for Diabetes diet education for PCOS and Gastroparesis. Consistent with the ADA’s standards of care, a comprehensive assessment/training record has been sent to medical records (see media tab).    Was working with Nakita De León RD for PCOS for the last 4 months. Reports less low blood sugars now that her meals are more balanced. Reports no issues with increase of metformin or use of ozempic. Reports finally losing weight after starting ozempic. Recent positive SIBO test- has not started xifaxin. Encouraged to discuss with gastro the results of her test. Stated understanding. Is eating 4-5 times a day. Discussed breaking this up into more occurences to see if it helps. Discussed 15-30 g carb per eating occurrence to help with blood sugar management. Encouraged limiting fat/fiber, sipping liquids, chewing foods well, and softer textures when symptoms worse.     Ms. Marks has been encouraged to call our office with questions or additional education needs. Please place referral for additional services or follow-up should need arise.    Thank you for the referral.

## 2023-02-27 NOTE — TELEPHONE ENCOUNTER
Patient states that she states that it is helping but not as much as the gabapentin was. She states that she is getting 70% improvement.

## 2023-03-02 NOTE — SIGNIFICANT NOTE
Patient educated on the following :    - If you are receiving Sedation for your procedure Nothing to Eat 6 hours and only clear liquids for 2 hours prior to your procedure.    -You will need to have someone drive you home after your PROCEDURE and remain with you for 24 hours after the PROCEDURE  - The date of your procedure, your are welcome to have one visitor at bedside or remain within 10-15 minutes of [facility]  -You will need to arrive at 0930 on 3/7/23 PROCEDURE  -Please contact [facility] at: [phone] with any questions and/or concerns

## 2023-03-07 ENCOUNTER — HOSPITAL ENCOUNTER (OUTPATIENT)
Dept: GENERAL RADIOLOGY | Facility: SURGERY CENTER | Age: 18
Setting detail: HOSPITAL OUTPATIENT SURGERY
End: 2023-03-07
Payer: COMMERCIAL

## 2023-03-07 ENCOUNTER — HOSPITAL ENCOUNTER (OUTPATIENT)
Facility: SURGERY CENTER | Age: 18
Setting detail: HOSPITAL OUTPATIENT SURGERY
Discharge: HOME | End: 2023-03-07
Attending: ANESTHESIOLOGY | Admitting: ANESTHESIOLOGY
Payer: COMMERCIAL

## 2023-03-07 VITALS
TEMPERATURE: 98.2 F | OXYGEN SATURATION: 95 % | RESPIRATION RATE: 16 BRPM | SYSTOLIC BLOOD PRESSURE: 123 MMHG | WEIGHT: 250 LBS | DIASTOLIC BLOOD PRESSURE: 83 MMHG | HEIGHT: 68 IN | HEART RATE: 77 BPM | BODY MASS INDEX: 37.89 KG/M2

## 2023-03-07 DIAGNOSIS — Z41.9 SURGERY, ELECTIVE: ICD-10-CM

## 2023-03-07 PROCEDURE — 77002 NEEDLE LOCALIZATION BY XRAY: CPT

## 2023-03-07 PROCEDURE — 76000 FLUOROSCOPY <1 HR PHYS/QHP: CPT

## 2023-03-07 PROCEDURE — 25510000001 IOPAMIDOL 61 % SOLUTION 30 ML VIAL: Performed by: ANESTHESIOLOGY

## 2023-03-07 PROCEDURE — 64483 NJX AA&/STRD TFRM EPI L/S 1: CPT | Performed by: ANESTHESIOLOGY

## 2023-03-07 PROCEDURE — 25010000002 FENTANYL CITRATE (PF) 50 MCG/ML SOLUTION: Performed by: ANESTHESIOLOGY

## 2023-03-07 PROCEDURE — 25010000002 MIDAZOLAM PER 1 MG: Performed by: ANESTHESIOLOGY

## 2023-03-07 PROCEDURE — 25010000002 METHYLPREDNISOLONE PER 80 MG: Performed by: ANESTHESIOLOGY

## 2023-03-07 PROCEDURE — S0260 H&P FOR SURGERY: HCPCS | Performed by: ANESTHESIOLOGY

## 2023-03-07 RX ORDER — SODIUM CHLORIDE 0.9 % (FLUSH) 0.9 %
10 SYRINGE (ML) INJECTION EVERY 12 HOURS SCHEDULED
Status: DISCONTINUED | OUTPATIENT
Start: 2023-03-07 | End: 2023-03-07 | Stop reason: HOSPADM

## 2023-03-07 RX ORDER — SODIUM CHLORIDE 0.9 % (FLUSH) 0.9 %
10 SYRINGE (ML) INJECTION AS NEEDED
Status: DISCONTINUED | OUTPATIENT
Start: 2023-03-07 | End: 2023-03-07 | Stop reason: HOSPADM

## 2023-03-07 RX ORDER — MIDAZOLAM HYDROCHLORIDE 1 MG/ML
INJECTION INTRAMUSCULAR; INTRAVENOUS AS NEEDED
Status: DISCONTINUED | OUTPATIENT
Start: 2023-03-07 | End: 2023-03-07 | Stop reason: HOSPADM

## 2023-03-07 RX ORDER — FENTANYL CITRATE 50 UG/ML
INJECTION, SOLUTION INTRAMUSCULAR; INTRAVENOUS AS NEEDED
Status: DISCONTINUED | OUTPATIENT
Start: 2023-03-07 | End: 2023-03-07 | Stop reason: HOSPADM

## 2023-03-07 NOTE — DISCHARGE INSTRUCTIONS
[facility] - Post-procedure Instructions          --  While there are no absolute restrictions, it is recommended that you do not perform strenuous activity today. In the morning, you may resume your level of activity as before your block.    --  If you have a band-aid at your injection site, please remove it later today. Observe the area for any redness, swelling, pus-like drainage, or a temperature over 101°. If any of these symptoms occur, please call your doctor at [phone]. If after office hours, leave a message and the on-call provider will return your call.    --  Ice may be applied to your injection site. It is recommended you avoid direct heat (heating pad; hot tub) for 1-2 days.    --  Call [facility] at [phone] if you experience persistent headache, persistent bleeding from the injection site, or severe pain not relieved by heat or oral medication.    --  Do not make important decisions today.    --  Due to the effects of the block and/or the I.V. Sedation, DO NOT drive or operate hazardous machinery for 12 hours.  Local anesthetics may cause numbness after procedure and precautions must be taken with regards to operating equipment as well as with walking, even if ambulating with assistance of another person or with an assistive device.    --  Do not drink alcohol for 12 hours.    -- You may return to work tomorrow, or as directed by your referring doctor.    --  Occasionally you may notice a slight increase in your pain after the procedure. This should start to improve within the next 24-48 hours. Radiofrequency ablation procedure pain may last 3-4 weeks.    --  It may take as long as 3-4 days before you notice a gradual improvement in your pain and/or other symptoms.    -- You may continue to take your prescribed pain medication as needed.    --  Some normal possible side effects of steroid use could include fluid retention, increased blood sugar, dull headache,  increased sweating, increased appetite, mood swings and flushing.    --  Diabetics are recommended to watch their blood glucose level closely for 24-48 hours after the injection.    --  Must stay in PACU for 20 min upon arrival and prove no leg weakness before being discharged.    --  IN THE EVENT OF A LIFE THREATENING EMERGENCY, (CHEST PAIN, BREATHING DIFFICULTIES, PARALYSIS…) YOU SHOULD GO TO YOUR NEAREST EMERGENCY ROOM.    --  You should be contacted by our office within 2-3 days to schedule follow up or next appointment date.  If not contacted within 7 days, please call the office at [phone]

## 2023-03-07 NOTE — H&P
Brief Pre-procedural / Pre-operative H&P        -----    Pertinent Diagnosis:   Bilateral lumbar radiculopathy    Proposed Procedure: Lumbar transforaminal epidural steroid injection anticipated bilaterally at L5 root selectively      Subjective   Wendy Marks is a 18 y.o. female  who presents for intervention.  She has a history of low back pain.      History of Present Illness     She has bilateral low back pain and bilateral lower extremity radicular pain and history of discogenic pain and lumbar degenerative disc disease is noted.  She does demonstrate positive bilateral straight leg raising maneuvers on most recent examination.  There is a history of contact on the L5 roots because of the degenerative change associated with the L4-L5 disc.  -------    The following portions of the patient's history were reviewed and updated as appropriate: allergies, current medications, past family history, past medical history, past social history, past surgical history and problem list.    Allergies   Allergen Reactions   • Adhesive Tape Rash   • Latex Rash         Current Facility-Administered Medications:   •  sodium chloride 0.9 % flush 10 mL, 10 mL, Intravenous, Q12H, Rhett Gutierrez MD  •  sodium chloride 0.9 % flush 10 mL, 10 mL, Intravenous, PRN, Rhett Gutierrez MD    No current facility-administered medications on file prior to encounter.     Current Outpatient Medications on File Prior to Encounter   Medication Sig Dispense Refill   • Ajovy 225 MG/1.5ML solution auto-injector      • albuterol sulfate  (90 Base) MCG/ACT inhaler Inhale 2 puffs.     • azithromycin (Zithromax) 200 MG/5ML suspension Take 5 mL by mouth Daily. Treatment for gastroparesis 150 mL 11   • clonazePAM (KlonoPIN) 0.5 MG tablet      • cloNIDine (CATAPRES) 0.2 MG tablet Take 1 tablet by mouth Every 12 (Twelve) Hours.     • DIAZEPAM 10MG SUPP      • DULoxetine (CYMBALTA) 30 MG capsule      • metFORMIN (GLUCOPHAGE) 1000 MG tablet       • montelukast (SINGULAIR) 10 MG tablet      • norethindrone (AYGESTIN) 5 MG tablet TAKE TWO TABLETS BY MOUTH TWICE A DAY AS DIRECTED 30 tablet 3   • omeprazole (priLOSEC) 40 MG capsule Take 1 capsule by mouth 2 (Two) Times a Day. 180 capsule 1   • ondansetron (ZOFRAN) 4 MG tablet Take 1 tablet by mouth Every 8 (Eight) Hours As Needed for Nausea or Vomiting. 25 tablet 3   • Probiotic Product ([employer]) capsule Take 1 tablet by mouth Daily. 30 capsule 5   • Semaglutide,0.25 or 0.5MG/DOS, (Ozempic, 0.25 or 0.5 MG/DOSE,) 2 MG/1.5ML solution pen-injector      • tiZANidine (ZANAFLEX) 4 MG tablet Take 1 tablet by mouth Daily As Needed for Muscle Spasms. 90 tablet 2   • levonorgestrel (Kyleena) 19.5 MG intrauterine device IUD      • promethazine (PHENERGAN) 25 MG tablet Take 1 tablet by mouth Every 6 (Six) Hours As Needed.     • [DISCONTINUED] gabapentin (NEURONTIN) 600 MG tablet TAKE ONE TABLET BY MOUTH THREE TIMES A DAY AS NEEDED FOR NERVE PAIN 90 tablet 0   • [DISCONTINUED] SUMAtriptan (IMITREX) 50 MG tablet          Patient Active Problem List   Diagnosis   • Migraine   • IBS (irritable bowel syndrome)   • Ankylosing spondylitis (HCC)   • Lumbar radiculopathy   • Insulin resistance   • Facet syndrome, lumbar   • DDD (degenerative disc disease), lumbar   • Chronic pain syndrome       Past Medical History:   Diagnosis Date   • Anemia    • Ankylosing spondylitis (HCC)    • Anxiety    • Chronic constipation    • Chronic diarrhea    • Chronic low back pain    • COVID-19 Jan 21   • Depression    • Endometriosis    • Gastroparesis    • GERD (gastroesophageal reflux disease)    • History of anemia    • Hyperlipidemia    • IBS (irritable bowel syndrome)    • Migraine     with aura   • PCOS (polycystic ovarian syndrome)    • PONV (postoperative nausea and vomiting)        Past Surgical History:   Procedure Laterality Date   • ABDOMINAL SURGERY     • APPENDECTOMY     • EPIDURAL Bilateral 04/21/2021     Procedure: LUMBAR/SACRAL TRANSFORAMINAL EPIDURAL - likely bilateral at L5;  Surgeon: Rhett Gutierrez MD;  Location: [facility];  Service: Pain Management;  Laterality: Bilateral;   • EPIDURAL Bilateral 12/10/2021    Procedure: Bilateral L5 LUMBAR/SACRAL TRANSFORAMINAL EPIDURAL;  Surgeon: Rhett Gutierrez MD;  Location: [facility];  Service: Pain Management;  Laterality: Bilateral;   • EPIDURAL Bilateral 07/20/2022    Procedure: Bilateral L5 LUMBAR/SACRAL TRANSFORAMINAL EPIDURAL;  Surgeon: Rhett Gutierrez MD;  Location: [facility];  Service: Pain Management;  Laterality: Bilateral;   • EPIDURAL Bilateral 12/01/2022    Procedure: LUMBAR/SACRAL TRANSFORAMINAL EPIDURAL BILAT L5--THERAPEUTIC;  Surgeon: Rhett Gutierrez MD;  Location: [facility];  Service: Pain Management;  Laterality: Bilateral;   • GYNECOLOGY EXAM UNDER ANESTHESIA N/A 03/09/2021    Procedure: GYNECOLOGY EXAM UNDER ANESTHESIAl, removal of Mirena intrauterine device and placement of Kyleena intrauterine device;  Surgeon: Josephine Cummins MD;  Location: [facility] OR;  Service: Obstetrics/Gynecology;  Laterality: N/A;  GYNECOLOGY EXAM UNDER ANESTHESIA  REMOVAL OF MIRENA INTRAUTERINE DEVICE AND PLACEMENT OF KYLEENA INTRAUTERINE DEVICE   • MEDIAL BRANCH BLOCK Bilateral 06/09/2022    Procedure: Bilateral L4-S1 MEDIAL BRANCH BLOCK;  Surgeon: Rhett Gutierrez MD;  Location: [facility];  Service: Pain Management;  Laterality: Bilateral;   • PELVIC LAPAROSCOPY      dx lsc endometriosis       Family History   Problem Relation Age of Onset   • Polycystic ovary syndrome Maternal Aunt    • Hyperthyroidism Maternal Aunt    • Liver disease Maternal Aunt    • Irritable bowel syndrome Mother    • Depression Mother    • Diabetes Father    • Pancreatitis Father    • Hyperthyroidism Sister    • Skin cancer Sister    • Colon polyps Sister    • Skin cancer Maternal Uncle    • Diabetes Maternal Grandfather    • Diabetes Paternal Grandfather    •  "Leukemia Paternal Grandfather    • Breast cancer Neg Hx    • Ovarian cancer Neg Hx    • Colon cancer Neg Hx    • Deep vein thrombosis Neg Hx    • Pulmonary embolism Neg Hx    • Malig Hyperthermia Neg Hx        Social History     Socioeconomic History   • Marital status: Single   Tobacco Use   • Smoking status: Never   • Smokeless tobacco: Never   Vaping Use   • Vaping Use: Never used   Substance and Sexual Activity   • Alcohol use: Never   • Drug use: Not Currently     Types: Marijuana     Comment: 1-2 times monthly   • Sexual activity: Never     Birth control/protection: I.U.D.     Comment: Mirena 12/2019       -------       Review of Systems  No Fever, No Chills, No ear pain, No sinus pressure or drainage, No eye pain or drainage, No cough, No SOB, No chest tightness nor chest pain, no palpitations.          Vitals:    03/02/23 1203 03/07/23 1005   BP:  133/77   BP Location:  Left arm   Patient Position:  Lying   Pulse:  88   Resp:  16   Temp:  97 °F (36.1 °C)   TempSrc:  Temporal   SpO2:  97%   Weight: 113 kg (250 lb) 113 kg (250 lb)   Height: 172.7 cm (68\") 172.7 cm (68\")         Objective   Physical Exam  VSS, NNR, NCAT, NMNA, NRD, AAOx3.      -------    Assessment & Plan:  - as noted above, the stated intervention is indicated  - Follow-up plan will be noted in the operative report        OV 4-      EMR Dragon/Transcription disclaimer:   Typed items in this encounter note may have been created by electronic transcription/translation software which converts spoken language to printed text. The electronic translation of spoken language may permit erroneous, or at times, nonsensical words or phrases to be inadvertently transcribed; Although I have reviewed the note for such errors, some may still exist.      "

## 2023-03-07 NOTE — OP NOTE
Bilateral L5 Lumbar Transforaminal Epidural Steroid Injection  [facility]      PREOPERATIVE DIAGNOSIS:  bilateral Lumbar Radiculopathy    POSTOPERATIVE DIAGNOSIS:  Same as preop diagnosis    PROCEDURE:  CPT 04437(-50) --  Diagnostic Transforaminal Epidural Steroid Injection at the L5 level, bilaterally    PRE-PROCEDURE DISCUSSION WITH PATIENT:    Risks and complications were discussed with the patient prior to starting the procedure and informed consent was obtained.  We discussed various topics including but not limited to bleeding, infection, injury, nerve injury, paralysis, coma, death, postprocedural painful flare-up, postprocedural site soreness, and a lack of pain relief.  We discussed the diagnostic aspect of transforaminal epidural / selective nerve root blockade.    SURGEON:  Rhett Gutierrez MD    REASON FOR PROCEDURE:    Degenerative changes are noted in the area. and Radiating pattern of pain is likely consistent with degenerative changes in the area.    SEDATION:  Versed 6mg & Fentanyl 50 mcg IV, The use of increased procedural sedation was carefully considered, and for this particular patient the need for additional procedural sedation seemed necessary in this instance to safely perform the procedure. and The patient had higher than average levels of procedural anxiety and the need to provide additional procedural sedation was needed to safely proceed.  ANESTHETIC:  Marcaine 0.25%  STEROID:  Methylprednisolone (DEPO MEDROL) 80mg/ml    DESCRIPTON OF PROCEDURE:  After obtaining informed consent, an I.V. was started in the preoperative area. The patient taken to the operating room and was placed in the prone position with a pillow under the abdomen.  All pressure points were well padded.  EKG, blood pressure, and pulse oximeter were monitored.  The lumbosacral area was prepped with Chloraprep and draped in a sterile fashion. Under fluoroscopic guidance in an oblique dimension, the  transverse process of the aforementioned vertebra at the junction of the body at 6 o'clock position on one side was identified. Skin and subcutaneous tissue was anesthetized with 1% lidocaine. A 22-gauge spinal needle was introduced under fluoroscopic guidance at the above junction into the foramen without parasthesias and into the epidural space. After confirming the position of the needle with PA, lateral, and oblique fluoroscopic views, aspiration was checked and was clear of blood or CSF.  Next, 1 mL of Omnipaque was injected. After seeing adequate spread on the corresponding nerve root, a total volume 2mL of injectate containing 0.5ml of the above mentioned local anesthetic, 1 ml saline,  and half of the above mentioned corticosteroid was injected into the epidural space.    The needle was removed intact.      Next, the same vertebral level and corresponding foramen on the contralateral side was visualized with fluoroscopy in an oblique view, and a similar approach was used to place the spinal needle in that foramen.  After confirming the position of the needle with PA, lateral, and oblique fluoroscopic views, aspiration was checked and was clear of blood or CSF.  Next, 1 mL of Omnipaque was injected. After seeing adequate spread on the corresponding nerve root, a total volume 2mL of injectate containing 0.5ml of the above mentioned local anesthetic, 1 ml saline, and half of the above mentioned corticosteroid was injected into the epidural space. The needle was removed intact.  Vital signs were stable throughout.      ESTIMATED BLOOD LOSS:  <5 mL  SPECIMENS:  none    COMPLICATIONS:   No complications were noted., There was no indication of vascular uptake on live injection of contrast dye., There was no indication of intrathecal uptake on live injection of contrast dye., There was not any evidence of dural puncture.   and The patient did not have any signs of postprocedure numbness nor weakness.    TOLERANCE &  DISCHARGE CONDITION:    The patient tolerated the procedure well.  The patient was transported to the recovery area without difficulties.  The patient was discharged to home under the care of family in stable and satisfactory condition.    PLAN OF CARE:  1. The patient was given our standard instruction sheet.  2. The patient will Return to clinic 4-6 wks.  3. The patient will resume all medications as per the medication reconciliation sheet.

## 2023-03-15 ENCOUNTER — TELEPHONE (OUTPATIENT)
Dept: GASTROENTEROLOGY | Facility: CLINIC | Age: 18
End: 2023-03-15
Payer: COMMERCIAL

## 2023-03-15 NOTE — TELEPHONE ENCOUNTER
----- Message from LUIS Malave sent at 2/17/2023 12:55 PM EST -----  Please inform the patient breath testing is positive for SIBO hydrogen predominant recommend course of Xifaxan to resolve this.  Antibiotics have been sent to her pharmacy.  Follow-up 6 weeks post treatment recommended.  Have her take a look at low F  ODMAP diet which will help prevent a reoccurrence of SIBO.

## 2023-03-15 NOTE — TELEPHONE ENCOUNTER
----- Message from LUIS Malave sent at 2/17/2023  3:27 PM EST -----  Please inform the patient that ultrasound shows fatty liver disease without dilation of her biliary tree.  No gallstones or gallbladder wall thickening.  Treatment for fatty liver disease includes low-fat diet, increase cardiovascular exercise, and w  eight loss to achieve an ideal BMI over the next 3 years.  See prior message regarding treatment for positive SIBO breath test.  Results can be discussed further when she returns for follow-up.

## 2023-03-15 NOTE — TELEPHONE ENCOUNTER
Called pt and advised of Vanessa CATHERINE's note. Verb understanding.     Pt reports that she was unable to get the xifaxan or alinia due to her insurance will not cover for it. Advised will update Vanessa CATHERINE.

## 2023-03-20 NOTE — TELEPHONE ENCOUNTER
Called pt and advised of Vanessa NP's note. Gave instruction for taking medication.  Pt verbalized understanding.

## 2023-03-26 DIAGNOSIS — M54.16 LUMBAR RADICULOPATHY: ICD-10-CM

## 2023-03-27 RX ORDER — PREGABALIN 100 MG/1
CAPSULE ORAL
Qty: 90 CAPSULE | Refills: 1 | Status: SHIPPED | OUTPATIENT
Start: 2023-03-27

## 2023-04-18 ENCOUNTER — PREP FOR SURGERY (OUTPATIENT)
Dept: SURGERY | Facility: SURGERY CENTER | Age: 18
End: 2023-04-18
Payer: COMMERCIAL

## 2023-04-18 ENCOUNTER — OFFICE VISIT (OUTPATIENT)
Dept: PAIN MEDICINE | Facility: CLINIC | Age: 18
End: 2023-04-18
Payer: COMMERCIAL

## 2023-04-18 VITALS
OXYGEN SATURATION: 98 % | DIASTOLIC BLOOD PRESSURE: 81 MMHG | HEART RATE: 87 BPM | WEIGHT: 255.6 LBS | TEMPERATURE: 98 F | SYSTOLIC BLOOD PRESSURE: 138 MMHG | HEIGHT: 68 IN | BODY MASS INDEX: 38.74 KG/M2

## 2023-04-18 DIAGNOSIS — M54.16 LUMBAR RADICULOPATHY: Primary | ICD-10-CM

## 2023-04-18 DIAGNOSIS — M51.36 DDD (DEGENERATIVE DISC DISEASE), LUMBAR: ICD-10-CM

## 2023-04-18 DIAGNOSIS — M45.9 ANKYLOSING SPONDYLITIS, UNSPECIFIED SITE OF SPINE: ICD-10-CM

## 2023-04-18 DIAGNOSIS — M62.838 MUSCLE SPASM: ICD-10-CM

## 2023-04-18 RX ORDER — SODIUM CHLORIDE 0.9 % (FLUSH) 0.9 %
10 SYRINGE (ML) INJECTION EVERY 12 HOURS SCHEDULED
OUTPATIENT
Start: 2023-04-18

## 2023-04-18 RX ORDER — PREGABALIN 100 MG/1
100 CAPSULE ORAL 3 TIMES DAILY
Qty: 270 CAPSULE | Refills: 0 | Status: SHIPPED | OUTPATIENT
Start: 2023-04-18

## 2023-04-18 RX ORDER — ADALIMUMAB 40MG/0.4ML
0.4 KIT SUBCUTANEOUS TAKE AS DIRECTED
COMMUNITY
Start: 2023-03-27

## 2023-04-18 RX ORDER — TIZANIDINE 4 MG/1
4 TABLET ORAL DAILY PRN
Qty: 90 TABLET | Refills: 0 | Status: SHIPPED | OUTPATIENT
Start: 2023-04-18

## 2023-04-18 RX ORDER — SODIUM CHLORIDE 0.9 % (FLUSH) 0.9 %
10 SYRINGE (ML) INJECTION AS NEEDED
OUTPATIENT
Start: 2023-04-18

## 2023-04-18 NOTE — PROGRESS NOTES
CHIEF COMPLAINT    LUMBAR/SACRAL TRANSFORAMINAL EPIDURAL BILATERAL L5 procedure follow up     Subjective   Wendy Marks is a 18 y.o. female  who presents to the office for follow-up of procedure.  She completed a LUMBAR/SACRAL TRANSFORAMINAL EPIDURAL BILATERAL L5   on  3/7/2023 performed by Dr. Gutierrez for management of back pain. Patient reports ongoing 50% relief from the procedure.  Patient is pleased to note significant improvement of pain in a transverse distribution across lumbosacral spine which radiates into the bilateral buttocks/hips and into the posterior aspects of the bilateral lower extremities.  She is also pleased to note that the addition of Lyrica 100 mg p.o. 3 times daily along with etodolac have also improved her painful complaints.    Patient states since her last office visit in addition to the HLA-B27 that she has also been diagnosed with ankylosing spondylitis and initiated Humira therapy on 4/15/2023.    Current medication regimen consists of pregabalin 100 mg 3 times daily, etodolac 400 mg twice daily and tizanidine 4 mg daily.  This medication regimen provides at least 50-60% improvement of painful symptoms allowing her to perform her ADLs and function daily.    Pain today 7/10 VAS in severity.    Back Pain  This is a chronic problem. The current episode started more than 1 year ago. The problem occurs constantly. The problem is unchanged. The pain is present in the lumbar spine. The quality of the pain is described as aching, shooting, stabbing and burning. Radiates to: BLEs. The pain is at a severity of 7/10. The pain is moderate. The pain is the same all the time. The symptoms are aggravated by standing and position (Walking). Associated symptoms include dysuria (current UTI), leg pain, numbness (bilateral legs and feet - intermittent) and weakness (bilateral legs). Pertinent negatives include no abdominal pain, fever or headaches.        PEG Assessment   What number best  describes your pain on average in the past week?4  What number best describes how, during the past week, pain has interfered with your enjoyment of life?5  What number best describes how, during the past week, pain has interfered with your general activity?  5    Review of Pertinent Medical Data ---    MRI OF THE LUMBAR SPINE WITHOUT CONTRAST 11/09/2022     CLINICAL HISTORY: Patient has low back pain, lumbar radiculopathy,  weakness in lower extremities.      TECHNIQUE: Sagittal T1, proton density and fat-suppressed T2-weighted  images were obtained of the lumbar spine.  In addition axial T2-weighted  images were obtained from L1 to S2 and thin cut axial T1-weighted images  were obtained from L1 to L3 then angled through the interspaces from L3  to S1.     FINDINGS: The distal thoracic cord and the conus is normal in signal  intensity.  The conus terminates at the superior body of the L1 lumbar  level which is normal.      At T12-L1 the disc space and facets are normal in appearance with no  canal or foraminal narrowing.      At L1-2 the disc space and facets are normal in appearance with no canal  or foraminal narrowing.     At L2-3 there is very minimal posterior disc bulge.  The facets are  normal.  There is prominent posterior epidural fat and is predominantly  the posterior right and left lateral epidural fat that wraps along the  margins of the thecal sac resulting in mild-to-moderate generalized  narrowing of the thecal sac with slight diminished spinal fluid bathing  the cauda equina at this level and there is no foraminal narrowing.      At L3-4 the disc space is normal.  There is mild facet overgrowth.   There is prominent epidural fat.  There is mild generalized narrowing of  the thecal sac.  There is no foraminal narrowing.      At L4-5 there is mild bilateral facet overgrowth.  There is posterior  protruding disc material that indents the ventral aspect of the thecal  sac that mildly narrows the thecal  sac and may contact the ventral  aspect of the traversing L5 nerve roots.  There is no foraminal  narrowing.      At L5-S1 there is minimal if any facet overgrowth, a 2 mm retrolisthesis  of L5 with respect to S1.  There is a small left posterior lateral  subarticular disc protrusion that abuts the ventral aspect of the  traversing left S1 nerve root but does not appear to displace or  compress it. There is no significant canal or lateral recess or right  foraminal narrowing.  There is mild left foraminal narrowing.     IMPRESSION:  1. The MRI images obtained are somewhat grainy and are decreased  resolution and this limits evaluation of the lumbar spine.  2. Patient has a somewhat small size lumbar spinal canal and has  prominent posterior epidural fat at several levels that contributes to  narrowing of the thecal sac.  3. At L2-3 there is a minimal posterior disc bulge and prominent  posterior and right and left lateral epidural fat that wraps along the  margins of the thecal sac and there is mild-to-moderate generalized  narrowing of the thecal sac and slight diminished spinal fluid bathing  the cauda equina at this level.  4. At L3-4 there is prominent posterior right and left lateral epidural  fat and mild generalized narrowing of the thecal sac.  5. At L4-5 there is mild bilateral facet overgrowth.  There is a  posterior disc protrusion that indents the ventral aspect of the thecal  sac that mildly narrows the thecal sac and abuts the ventral aspect of  the traversing L5 nerve roots.  6. At L5-S1 there is mild disc space narrowing and disc desiccation, 2-3  mm retrolisthesis of L5 with respect to S1 with uncovering of the  posterior inferior aspect of the L5-S1 disc space and there is a left  posterior lateral-subarticular small disc protrusion that abuts the  ventral aspect of the traversing left S1 nerve root but does not  displace it.  There is minimal spurring and bulging disc material into  the left  "foramen with mild left foraminal narrowing.  The remainder of  the lumbar spine MRI is unremarkable.     Caveat: Due to the graininess of the images and the limited evaluation  on this MRI of the lumbar spine, If the patient has a persistent lumbar  radiculopathy consider a myelogram and postmyelogram CT for a more  comprehensive assessment.     This report was finalized on 11/11/2022 8:16 AM by Dr. Dat Ferguson M.D.    The following portions of the patient's history were reviewed and updated as appropriate: allergies, current medications, past family history, past medical history, past social history, past surgical history and problem list.    Review of Systems   Constitutional: Negative for fatigue and fever.   Respiratory: Negative for cough and shortness of breath.    Gastrointestinal: Negative for abdominal pain, constipation and diarrhea.   Genitourinary: Positive for dysuria (current UTI). Negative for difficulty urinating.   Musculoskeletal: Positive for back pain.   Neurological: Positive for weakness (bilateral legs) and numbness (bilateral legs and feet - intermittent). Negative for dizziness, light-headedness and headaches.   Psychiatric/Behavioral: Positive for sleep disturbance. Negative for suicidal ideas.     I have reviewed and confirmed the accuracy of the ROS as documented by the MA/LPN/RN GEOVANY Garcia     Vitals:    04/18/23 0929   BP: 138/81   BP Location: Left arm   Pulse: 87   Temp: 98 °F (36.7 °C)   TempSrc: Temporal   SpO2: 98%   Weight: 116 kg (255 lb 9.6 oz)   Height: 172.7 cm (68\")   PainSc:   7   PainLoc: Back         Objective   Physical Exam  Vitals and nursing note reviewed.   Constitutional:       Appearance: Normal appearance. She is obese.   HENT:      Head: Normocephalic.   Pulmonary:      Effort: Pulmonary effort is normal.   Musculoskeletal:      Lumbar back: Spasms and tenderness present. Decreased range of motion. Positive right straight leg raise test.        " Back:    Skin:     General: Skin is warm and dry.   Neurological:      General: No focal deficit present.      Mental Status: She is alert and oriented to person, place, and time.      Cranial Nerves: Cranial nerves 2-12 are intact.      Sensory: Sensation is intact.      Motor: Motor function is intact.      Gait: Gait is intact.   Psychiatric:         Mood and Affect: Mood normal.         Behavior: Behavior normal.         Thought Content: Thought content normal.         Judgment: Judgment normal.         Assessment & Plan   Diagnoses and all orders for this visit:    1. Lumbar radiculopathy (Primary)  -     pregabalin (LYRICA) 100 MG capsule; Take 1 capsule by mouth 3 (Three) Times a Day.  Dispense: 270 capsule; Refill: 0    2. DDD (degenerative disc disease), lumbar  -     pregabalin (LYRICA) 100 MG capsule; Take 1 capsule by mouth 3 (Three) Times a Day.  Dispense: 270 capsule; Refill: 0    3. Ankylosing spondylitis, unspecified site of spine    4. Muscle spasm  -     tiZANidine (ZANAFLEX) 4 MG tablet; Take 1 tablet by mouth Daily As Needed for Muscle Spasms.  Dispense: 90 tablet; Refill: 0        --- Follow-up in 6 weeks or sooner as needed  --- Refills provided today on pregabalin and tizanidine.  --- Patient will return for therapeutic bilateral L5 TFESI after 6/5/2023 if pain has progressively worsened at that time  --- Obtain yearly UDS at her next office visit       SHARIF ALCOCER  As part of the patient's treatment plan, I am prescribing controlled substances. The patient has been made aware of appropriate use of such medications, including potential risk of somnolence, limited ability to drive and/or work safely, and the potential for dependence or overdose. It has also been made clear that these medications are for use by this patient only, without concomitant use of alcohol or other substances unless prescribed.     Patient has completed prescribing agreement detailing terms of continued prescribing of  controlled substances, including monitoring SHARIF reports, urine drug screening, and pill counts if necessary. The patient is aware that inappropriate use will results in cessation of prescribing such medications.    As the clinician, I personally reviewed the SHARIF from 4/18/2023 while the patient was in the office today.    History and physical exam exhibit continued safe and appropriate use of controlled substances.       Dictated utilizing Dragon dictation.

## 2023-04-25 ENCOUNTER — LAB (OUTPATIENT)
Dept: OBSTETRICS AND GYNECOLOGY | Facility: CLINIC | Age: 18
End: 2023-04-25
Payer: COMMERCIAL

## 2023-04-25 DIAGNOSIS — E88.81 INSULIN RESISTANCE: Primary | ICD-10-CM

## 2023-04-26 LAB
INSULIN SERPL-ACNC: 41.5 UIU/ML (ref 2.6–24.9)
PROLACTIN SERPL-MCNC: 17.3 NG/ML (ref 4.8–23.3)

## 2023-04-27 NOTE — PROGRESS NOTES
PIP= prolactin is now normal. Her fasting insulin is still elevated at 41.5. we will discuss this further at her f/u appt in May

## 2023-05-03 ENCOUNTER — TRANSCRIBE ORDERS (OUTPATIENT)
Dept: SURGERY | Facility: SURGERY CENTER | Age: 18
End: 2023-05-03
Payer: COMMERCIAL

## 2023-05-03 DIAGNOSIS — Z41.9 SURGERY, ELECTIVE: Primary | ICD-10-CM

## 2023-05-11 ENCOUNTER — OFFICE VISIT (OUTPATIENT)
Dept: OBSTETRICS AND GYNECOLOGY | Facility: CLINIC | Age: 18
End: 2023-05-11
Payer: COMMERCIAL

## 2023-05-11 VITALS
BODY MASS INDEX: 39.43 KG/M2 | DIASTOLIC BLOOD PRESSURE: 92 MMHG | HEIGHT: 68 IN | SYSTOLIC BLOOD PRESSURE: 126 MMHG | WEIGHT: 260.2 LBS

## 2023-05-11 DIAGNOSIS — N90.7 VULVAR CYST: ICD-10-CM

## 2023-05-11 DIAGNOSIS — E28.2 PCOS (POLYCYSTIC OVARIAN SYNDROME): Primary | ICD-10-CM

## 2023-05-11 DIAGNOSIS — R79.89 ELEVATED PROLACTIN LEVEL: ICD-10-CM

## 2023-05-11 LAB

## 2023-05-11 RX ORDER — CEPHALEXIN 500 MG/1
500 CAPSULE ORAL EVERY 6 HOURS
Qty: 28 CAPSULE | Refills: 0 | Status: SHIPPED | OUTPATIENT
Start: 2023-05-11 | End: 2023-05-18

## 2023-05-11 RX ORDER — FREMANEZUMAB-VFRM 225 MG/1.5ML
225 INJECTION SUBCUTANEOUS
COMMUNITY
Start: 2023-01-09

## 2023-05-11 RX ORDER — ALBUTEROL SULFATE 90 UG/1
2 AEROSOL, METERED RESPIRATORY (INHALATION) EVERY 4 HOURS PRN
COMMUNITY
Start: 2023-03-14

## 2023-05-11 RX ORDER — SUMATRIPTAN 50 MG/1
TABLET, FILM COATED ORAL
COMMUNITY
Start: 2023-03-18

## 2023-05-11 RX ORDER — GLUCAGON 3 MG/1
3 POWDER NASAL
COMMUNITY
Start: 2023-03-01

## 2023-05-11 RX ORDER — SEMAGLUTIDE 1.34 MG/ML
0.5 INJECTION, SOLUTION SUBCUTANEOUS
COMMUNITY
Start: 2023-01-28

## 2023-05-11 RX ORDER — ARIPIPRAZOLE 5 MG/1
TABLET ORAL
COMMUNITY
Start: 2023-02-03

## 2023-05-11 RX ORDER — PREGABALIN 75 MG/1
1 CAPSULE ORAL 3 TIMES DAILY
COMMUNITY
Start: 2023-02-01

## 2023-05-11 RX ORDER — ROSUVASTATIN CALCIUM 10 MG/1
TABLET, COATED ORAL
COMMUNITY
Start: 2023-04-28

## 2023-05-11 RX ORDER — MONTELUKAST SODIUM 10 MG/1
1 TABLET ORAL NIGHTLY
COMMUNITY
Start: 2023-02-17

## 2023-05-11 RX ORDER — ARIPIPRAZOLE 10 MG/1
TABLET ORAL
COMMUNITY
Start: 2023-05-03

## 2023-05-11 RX ORDER — AMOXICILLIN 250 MG
CAPSULE ORAL
COMMUNITY

## 2023-05-11 RX ORDER — DULOXETIN HYDROCHLORIDE 60 MG/1
CAPSULE, DELAYED RELEASE ORAL
COMMUNITY
Start: 2023-05-03

## 2023-05-11 RX ORDER — MAGNESIUM 200 MG
TABLET ORAL
COMMUNITY

## 2023-05-11 RX ORDER — MIRABEGRON 50 MG/1
TABLET, FILM COATED, EXTENDED RELEASE ORAL
COMMUNITY
Start: 2023-04-22

## 2023-05-11 NOTE — PROGRESS NOTES
"      Wendy Marks is a 18 y.o. patient who presents for follow up of   Chief Complaint   Patient presents with   • Follow-up     Birth Control     19 yo est pt here for f/u of IUD and a possible vulvar problem. She had a Kyleena IUD in 3/ 2021 and she has had some cramping and so is also on northindrone as well. She is taking metformin and Ozempic for weight loss and she is seeing endocrinology. Her prolactin in 4/2023 was normal.  Her FI is still elevated but improving. She has noticed some decreased sensation and discomfort on her R labia. She has never been SA.        The following portions of the patient's history were reviewed and updated as appropriate: allergies, current medications and problem list.    Review of Systems   Constitutional: Positive for activity change and unexpected weight change.   Genitourinary: Positive for dyspareunia, genital sores, pelvic pain and vaginal pain. Negative for vaginal discharge.       /92   Ht 172.7 cm (67.99\")   Wt 118 kg (260 lb 3.2 oz)   BMI 39.57 kg/m²     Physical Exam  Vitals and nursing note reviewed. Exam conducted with a chaperone present.   Constitutional:       Appearance: Normal appearance. She is well-developed. She is obese.   HENT:      Head: Normocephalic and atraumatic.   Eyes:      General: No scleral icterus.     Conjunctiva/sclera: Conjunctivae normal.   Neck:      Thyroid: No thyromegaly.   Abdominal:      General: There is no distension.      Palpations: Abdomen is soft. There is no mass.      Tenderness: There is no abdominal tenderness. There is no guarding or rebound.      Hernia: No hernia is present.   Genitourinary:     Labia:         Right: Tenderness and lesion present. No rash or injury.         Left: No rash, tenderness, lesion or injury.        Skin:     General: Skin is warm and dry.   Neurological:      Mental Status: She is alert and oriented to person, place, and time.   Psychiatric:         Mood and Affect: Mood normal.  "        Behavior: Behavior normal.         Thought Content: Thought content normal.         Judgment: Judgment normal.         A/P:  1. PCOS- seeing endocrine and they are managing her meds.   2. H/O elevated prolactin- values normal 4/2023   3. CS- Kyleena IUD 3/2021  4. R labial cyst- enc warm compresses and ERX Keflex 500 mg QID x 7 days.   5. RTO 2 weeks and if no improvement, will I&D  6. Time Spent: I spent > 30 minutes caring for Wendy on this date of service. This time includes time spent by me in the following activities: preparing for the visit, reviewing tests, obtaining and/or reviewing a separately obtained history, performing a medically appropriate examination and/or evaluation, counseling and educating the patient/family/caregiver, ordering medications, tests, or procedures, documenting information in the medical record and independently interpreting results and communicating that information with the patient/family/caregiver.         Assessment & Plan   Diagnoses and all orders for this visit:    1. PCOS (polycystic ovarian syndrome) (Primary)  -     POC Urinalysis Dipstick  -     POC Pregnancy, Urine    2. Elevated prolactin level    3. Vulvar cyst    Other orders  -     cephalexin (KEFLEX) 500 MG capsule; Take 1 capsule by mouth Every 6 (Six) Hours for 7 days.  Dispense: 28 capsule; Refill: 0                 No follow-ups on file.      Josephine Cummins MD    5/11/2023  20:52 EDT

## 2023-05-16 DIAGNOSIS — M47.816 FACET SYNDROME, LUMBAR: ICD-10-CM

## 2023-05-18 RX ORDER — ETODOLAC 400 MG/1
TABLET, FILM COATED ORAL
Qty: 60 TABLET | Refills: 0 | Status: SHIPPED | OUTPATIENT
Start: 2023-05-18

## 2023-05-24 ENCOUNTER — OFFICE VISIT (OUTPATIENT)
Dept: OBSTETRICS AND GYNECOLOGY | Facility: CLINIC | Age: 18
End: 2023-05-24
Payer: COMMERCIAL

## 2023-05-24 VITALS
WEIGHT: 264.2 LBS | SYSTOLIC BLOOD PRESSURE: 110 MMHG | BODY MASS INDEX: 40.04 KG/M2 | DIASTOLIC BLOOD PRESSURE: 80 MMHG | HEIGHT: 68 IN

## 2023-05-24 DIAGNOSIS — N90.7 LABIAL CYST: Primary | ICD-10-CM

## 2023-05-24 DIAGNOSIS — R10.2 VAGINAL PAIN: ICD-10-CM

## 2023-05-24 RX ORDER — FOLIC ACID 1 MG/1
TABLET ORAL
COMMUNITY

## 2023-05-24 NOTE — PROGRESS NOTES
"      Wendy Marks is a 18 y.o. patient who presents for follow up of   Chief Complaint   Patient presents with   • RECHECK VULVAR CYST     17 yo est pt here for 2 week f/u of R cust of Labia minus. She finished her Keflex and this area is much smaller and less painful. She is still doing compresses and says that they have helped.         The following portions of the patient's history were reviewed and updated as appropriate: allergies, current medications and problem list.    Review of Systems   Constitutional: Positive for activity change.   Genitourinary: Positive for genital sores (improved) and vaginal pain.       /80   Ht 172.7 cm (68\")   Wt 120 kg (264 lb 3.2 oz)   BMI 40.17 kg/m²     Physical Exam  Vitals and nursing note reviewed. Exam conducted with a chaperone present.   Constitutional:       Appearance: Normal appearance. She is well-developed. She is obese.   HENT:      Head: Normocephalic and atraumatic.   Eyes:      General: No scleral icterus.     Conjunctiva/sclera: Conjunctivae normal.   Neck:      Thyroid: No thyromegaly.   Abdominal:      General: There is no distension.      Palpations: Abdomen is soft. There is no mass.      Tenderness: There is no abdominal tenderness. There is no guarding or rebound.      Hernia: No hernia is present.   Genitourinary:     Labia:         Right: Tenderness and lesion present. No rash or injury.         Left: No rash, tenderness, lesion or injury.        Skin:     General: Skin is warm and dry.   Neurological:      Mental Status: She is alert and oriented to person, place, and time.   Psychiatric:         Mood and Affect: Mood normal.         Behavior: Behavior normal.         Thought Content: Thought content normal.         Judgment: Judgment normal.         A/P:  1. R labial cyst- improved with ABX and compresses. If symptoms recur, call back for evaluation and I&D.   2. Vaginal pain- improved.   3. RHM- RTO 2/2024 annual and/or prn. "     Assessment & Plan   Diagnoses and all orders for this visit:    1. Labial cyst (Primary)    2. Vaginal pain                 No follow-ups on file.      Josephine Cummins MD    5/24/2023  14:58 EDT

## 2023-06-06 ENCOUNTER — HOSPITAL ENCOUNTER (OUTPATIENT)
Facility: SURGERY CENTER | Age: 18
Setting detail: HOSPITAL OUTPATIENT SURGERY
Discharge: HOME | End: 2023-06-06
Attending: ANESTHESIOLOGY | Admitting: ANESTHESIOLOGY
Payer: COMMERCIAL

## 2023-06-06 ENCOUNTER — HOSPITAL ENCOUNTER (OUTPATIENT)
Dept: GENERAL RADIOLOGY | Facility: SURGERY CENTER | Age: 18
Setting detail: HOSPITAL OUTPATIENT SURGERY
End: 2023-06-06
Payer: COMMERCIAL

## 2023-06-06 VITALS
DIASTOLIC BLOOD PRESSURE: 64 MMHG | HEIGHT: 68 IN | HEART RATE: 99 BPM | TEMPERATURE: 98.2 F | BODY MASS INDEX: 39.4 KG/M2 | OXYGEN SATURATION: 96 % | RESPIRATION RATE: 16 BRPM | WEIGHT: 260 LBS | SYSTOLIC BLOOD PRESSURE: 113 MMHG

## 2023-06-06 DIAGNOSIS — Z41.9 SURGERY, ELECTIVE: ICD-10-CM

## 2023-06-06 PROCEDURE — 77002 NEEDLE LOCALIZATION BY XRAY: CPT

## 2023-06-06 PROCEDURE — 25510000001 IOPAMIDOL 61 % SOLUTION 30 ML VIAL: Performed by: ANESTHESIOLOGY

## 2023-06-06 PROCEDURE — 25010000002 METHYLPREDNISOLONE PER 80 MG: Performed by: ANESTHESIOLOGY

## 2023-06-06 PROCEDURE — 64483 NJX AA&/STRD TFRM EPI L/S 1: CPT | Performed by: ANESTHESIOLOGY

## 2023-06-06 PROCEDURE — 25010000002 FENTANYL CITRATE (PF) 50 MCG/ML SOLUTION: Performed by: ANESTHESIOLOGY

## 2023-06-06 PROCEDURE — 76000 FLUOROSCOPY <1 HR PHYS/QHP: CPT

## 2023-06-06 PROCEDURE — 25010000002 MIDAZOLAM PER 1 MG: Performed by: ANESTHESIOLOGY

## 2023-06-06 RX ORDER — FENTANYL CITRATE 50 UG/ML
INJECTION, SOLUTION INTRAMUSCULAR; INTRAVENOUS AS NEEDED
Status: DISCONTINUED | OUTPATIENT
Start: 2023-06-06 | End: 2023-06-06 | Stop reason: HOSPADM

## 2023-06-06 RX ORDER — SODIUM CHLORIDE 0.9 % (FLUSH) 0.9 %
10 SYRINGE (ML) INJECTION EVERY 12 HOURS SCHEDULED
Status: DISCONTINUED | OUTPATIENT
Start: 2023-06-06 | End: 2023-06-06 | Stop reason: HOSPADM

## 2023-06-06 RX ORDER — MIDAZOLAM HYDROCHLORIDE 1 MG/ML
INJECTION INTRAMUSCULAR; INTRAVENOUS AS NEEDED
Status: DISCONTINUED | OUTPATIENT
Start: 2023-06-06 | End: 2023-06-06 | Stop reason: HOSPADM

## 2023-06-06 RX ORDER — SODIUM CHLORIDE 0.9 % (FLUSH) 0.9 %
10 SYRINGE (ML) INJECTION AS NEEDED
Status: DISCONTINUED | OUTPATIENT
Start: 2023-06-06 | End: 2023-06-06 | Stop reason: HOSPADM

## 2023-06-06 NOTE — DISCHARGE INSTRUCTIONS
[facility] - Post-procedure Instructions          --  While there are no absolute restrictions, it is recommended that you do not perform strenuous activity today. In the morning, you may resume your level of activity as before your block.    --  If you have a band-aid at your injection site, please remove it later today. Observe the area for any redness, swelling, pus-like drainage, or a temperature over 101°. If any of these symptoms occur, please call your doctor at [phone]. If after office hours, leave a message and the on-call provider will return your call.    --  Ice may be applied to your injection site. It is recommended you avoid direct heat (heating pad; hot tub) for 1-2 days.    --  Call [facility] at [phone] if you experience persistent headache, persistent bleeding from the injection site, or severe pain not relieved by heat or oral medication.    --  Do not make important decisions today.    --  Due to the effects of the block and/or the I.V. Sedation, DO NOT drive or operate hazardous machinery for 12 hours.  Local anesthetics may cause numbness after procedure and precautions must be taken with regards to operating equipment as well as with walking, even if ambulating with assistance of another person or with an assistive device.    --  Do not drink alcohol for 12 hours.    -- You may return to work tomorrow, or as directed by your referring doctor.    --  Occasionally you may notice a slight increase in your pain after the procedure. This should start to improve within the next 24-48 hours. Radiofrequency ablation procedure pain may last 3-4 weeks.    --  It may take as long as 3-4 days before you notice a gradual improvement in your pain and/or other symptoms.    -- You may continue to take your prescribed pain medication as needed.    --  Some normal possible side effects of steroid use could include fluid retention, increased blood sugar, dull headache,  increased sweating, increased appetite, mood swings and flushing.    --  Diabetics are recommended to watch their blood glucose level closely for 24-48 hours after the injection.    --  Must stay in PACU for 20 min upon arrival and prove no leg weakness before being discharged.    --  IN THE EVENT OF A LIFE THREATENING EMERGENCY, (CHEST PAIN, BREATHING DIFFICULTIES, PARALYSIS…) YOU SHOULD GO TO YOUR NEAREST EMERGENCY ROOM.    --  You should be contacted by our office within 2-3 days to schedule follow up or next appointment date.  If not contacted within 7 days, please call the office at [phone]

## 2023-06-06 NOTE — H&P
Brief Pre-procedural / Pre-operative H&P        -----    Pertinent Diagnosis: Lumbar degenerative disc disease and radiculopathy.  Other diagnosis includes a history of ankylosing spondylitis    Proposed Procedure: Lumbar transforaminal epidural steroid injection, likely bilaterally, likely L5      Subjective   Wendy Marks is a 18 y.o. female  who presents for intervention.  She has a history of currently bilateral radicular symptoms.      History of Present Illness     History of back pain also radicular elements.  Interlaminar midline.  She is a difficult consideration because of ankylosing spondylitis.  She has displayed positive straight leg raising maneuvers he has some mild canal narrowing especially at L4-5.    She had 50% relief sustained for a few months after previous transforaminal injection but she did have recrudescence of bilateral buttock and hip radiation pain from the lumbosacral spine posteriorly into the lower extremities and has been incompletely suppressed at this time with the Lyrica, although that does help.  -------    The following portions of the patient's history were reviewed and updated as appropriate: allergies, current medications, past family history, past medical history, past social history, past surgical history and problem list.    Allergies   Allergen Reactions    Adhesive Tape Rash    Latex Rash         Current Facility-Administered Medications:     sodium chloride 0.9 % flush 10 mL, 10 mL, Intravenous, Q12H, Rhett Gutierrez MD    sodium chloride 0.9 % flush 10 mL, 10 mL, Intravenous, PRN, Rhett Gutierrez MD    No current facility-administered medications on file prior to encounter.     Current Outpatient Medications on File Prior to Encounter   Medication Sig Dispense Refill    albuterol sulfate  (90 Base) MCG/ACT inhaler Inhale 2 puffs.      clonazePAM (KlonoPIN) 0.5 MG tablet       cloNIDine (CATAPRES) 0.2 MG tablet Take 1 tablet by mouth Every 12 (Twelve)  Hours.      levonorgestrel (Kyleena) 19.5 MG intrauterine device IUD       norethindrone (AYGESTIN) 5 MG tablet TAKE TWO TABLETS BY MOUTH TWICE A DAY AS DIRECTED 30 tablet 3    omeprazole (priLOSEC) 40 MG capsule Take 1 capsule by mouth 2 (Two) Times a Day. 180 capsule 1    ondansetron (ZOFRAN) 4 MG tablet Take 1 tablet by mouth Every 8 (Eight) Hours As Needed for Nausea or Vomiting. 25 tablet 3    pregabalin (LYRICA) 100 MG capsule Take 1 capsule by mouth 3 (Three) Times a Day. 270 capsule 0    Probiotic Product (Didasco) capsule Take 1 tablet by mouth Daily. 30 capsule 5    tiZANidine (ZANAFLEX) 4 MG tablet Take 1 tablet by mouth Daily As Needed for Muscle Spasms. 90 tablet 0    Adalimumab (Humira Pen) 40 MG/0.4ML Pen-injector Kit Inject 40 mg under the skin into the appropriate area as directed Take As Directed.      azithromycin (Zithromax) 200 MG/5ML suspension Take 5 mL by mouth Daily. Treatment for gastroparesis 150 mL 11    DIAZEPAM 10MG SUPP          Patient Active Problem List   Diagnosis    Migraine    IBS (irritable bowel syndrome)    Ankylosing spondylitis    Lumbar radiculopathy    Insulin resistance    Facet syndrome, lumbar    DDD (degenerative disc disease), lumbar    Chronic pain syndrome    Muscle spasm       Past Medical History:   Diagnosis Date    Anemia     Ankylosing spondylitis     Anxiety     Bipolar disorder     Chronic constipation     Chronic diarrhea     Chronic low back pain     Chronic pain disorder     Cluster headache     COVID-19 Jan 21    Depression     Endometriosis     Extremity pain     Gastroparesis     GERD (gastroesophageal reflux disease)     Headache, tension-type     History of anemia     Hyperlipidemia     IBS (irritable bowel syndrome)     Joint pain     Lumbosacral disc disease     Migraine     with aura    Neck pain     PCOS (polycystic ovarian syndrome)     Polycystic ovary syndrome     PONV (postoperative nausea and vomiting)     TMJ dysfunction      Trigeminal neuralgia        Past Surgical History:   Procedure Laterality Date    ABDOMINAL SURGERY      APPENDECTOMY      EPIDURAL Bilateral 04/21/2021    Procedure: LUMBAR/SACRAL TRANSFORAMINAL EPIDURAL - likely bilateral at L5;  Surgeon: Rhett Gutierrez MD;  Location: [facility];  Service: Pain Management;  Laterality: Bilateral;    EPIDURAL Bilateral 12/10/2021    Procedure: Bilateral L5 LUMBAR/SACRAL TRANSFORAMINAL EPIDURAL;  Surgeon: Rhett Gutierrez MD;  Location: [facility];  Service: Pain Management;  Laterality: Bilateral;    EPIDURAL Bilateral 07/20/2022    Procedure: Bilateral L5 LUMBAR/SACRAL TRANSFORAMINAL EPIDURAL;  Surgeon: Rhett Gutierrez MD;  Location: [facility];  Service: Pain Management;  Laterality: Bilateral;    EPIDURAL Bilateral 12/01/2022    Procedure: LUMBAR/SACRAL TRANSFORAMINAL EPIDURAL BILAT L5--THERAPEUTIC;  Surgeon: Rhett Gutierrez MD;  Location: [facility];  Service: Pain Management;  Laterality: Bilateral;    EPIDURAL Bilateral 03/07/2023    Procedure: LUMBAR/SACRAL TRANSFORAMINAL EPIDURAL BILATERAL L5   98640;  Surgeon: Rhett Gutierrez MD;  Location: [facility];  Service: Pain Management;  Laterality: Bilateral;    EPIDURAL BLOCK      FULGURATION ENDOMETRIOSIS      GYNECOLOGY EXAM UNDER ANESTHESIA N/A 03/09/2021    Procedure: GYNECOLOGY EXAM UNDER ANESTHESIAl, removal of Mirena intrauterine device and placement of Kyleena intrauterine device;  Surgeon: Josephine Cummins MD;  Location: [facility];  Service: Obstetrics/Gynecology;  Laterality: N/A;  GYNECOLOGY EXAM UNDER ANESTHESIA  REMOVAL OF MIRENA INTRAUTERINE DEVICE AND PLACEMENT OF KYLEENA INTRAUTERINE DEVICE    MEDIAL BRANCH BLOCK Bilateral 06/09/2022    Procedure: Bilateral L4-S1 MEDIAL BRANCH BLOCK;  Surgeon: Rhett Gutierrez MD;  Location: [facility];  Service: Pain Management;  Laterality: Bilateral;    PELVIC LAPAROSCOPY  03/30/2023    dx [facility] endometriosis       Family History  "  Problem Relation Age of Onset    Polycystic ovary syndrome Maternal Aunt     Hyperthyroidism Maternal Aunt     Liver disease Maternal Aunt     Irritable bowel syndrome Mother     Depression Mother     Arthritis Mother     GI problems Mother     Mental illness Mother     Migraines Mother     Diabetes Father     Pancreatitis Father     Mental illness Father     Hyperthyroidism Sister     Skin cancer Sister     Colon polyps Sister     Skin cancer Maternal Uncle     Diabetes Maternal Grandfather     Alzheimer's disease Maternal Grandfather     Diabetes Paternal Grandfather     Leukemia Paternal Grandfather     Breast cancer Neg Hx     Ovarian cancer Neg Hx     Colon cancer Neg Hx     Deep vein thrombosis Neg Hx     Pulmonary embolism Neg Hx     Malig Hyperthermia Neg Hx        Social History     Socioeconomic History    Marital status: Single   Tobacco Use    Smoking status: Never    Smokeless tobacco: Never   Vaping Use    Vaping Use: Never used   Substance and Sexual Activity    Alcohol use: Never    Drug use: Not Currently     Types: Marijuana     Comment: 1-2 times monthly    Sexual activity: Never     Birth control/protection: I.U.D.     Comment: Kyleena IUD 3/2021       -------       Review of Systems  No Fever, No Chills, No ear pain, No sinus pressure or drainage, No eye pain or drainage, No cough, No SOB, No chest tightness nor chest pain, no palpitations.          Vitals:    06/06/23 1433   BP: 121/87   BP Location: Left arm   Patient Position: Sitting   Pulse: 112   Resp: 18   Temp: 99 °F (37.2 °C)   TempSrc: Temporal   SpO2: 97%   Weight: 118 kg (260 lb)   Height: 172.7 cm (68\")         Objective   Physical Exam  VSS, NNR, NCAT, NMNA, NRD, AAOx3.      -------    Assessment & Plan:  - as noted above, the stated intervention is indicated  - Follow-up plan will be noted in the operative report        Needs to schedule follow-up in a couple of weeks      EMR Dragon/Transcription disclaimer:   Typed items in " this encounter note may have been created by electronic transcription/translation software which converts spoken language to printed text. The electronic translation of spoken language may permit erroneous, or at times, nonsensical words or phrases to be inadvertently transcribed; Although I have reviewed the note for such errors, some may still exist.

## 2023-06-06 NOTE — OP NOTE
Bilateral L5 Lumbar Transforaminal Epidural Steroid Injection  [facility]      PREOPERATIVE DIAGNOSIS:  bilateral Lumbar Radiculopathy    POSTOPERATIVE DIAGNOSIS:  Same as preop diagnosis    PROCEDURE:  CPT 82699(-50) --  Diagnostic Transforaminal Epidural Steroid Injection at the L5 level, bilaterally    PRE-PROCEDURE DISCUSSION WITH PATIENT:    Risks and complications were discussed with the patient prior to starting the procedure and informed consent was obtained.  We discussed various topics including but not limited to bleeding, infection, injury, nerve injury, paralysis, coma, death, postprocedural painful flare-up, postprocedural site soreness, and a lack of pain relief.  We discussed the diagnostic aspect of transforaminal epidural / selective nerve root blockade.    SURGEON:  Rhett Gutierrez MD    REASON FOR PROCEDURE:    Previous clinically significant therapeutic effect is noted., Degenerative changes are noted in the area., Radiating pattern of pain is likely consistent with degenerative changes in the area., and Radicular pain pattern seems consistent with this dermatome.    SEDATION:  Versed 6mg & Fentanyl 50 mcg IV, The use of increased procedural sedation was carefully considered, and for this particular patient the need for additional procedural sedation seemed necessary in this instance to safely perform the procedure., and The patient had higher than average levels of procedural anxiety and the need to provide additional procedural sedation was needed to safely proceed.  ANESTHETIC:  Marcaine 0.25%  STEROID:  Methylprednisolone (DEPO MEDROL) 80mg/ml    DESCRIPTON OF PROCEDURE:  After obtaining informed consent, an I.V. was started in the preoperative area. The patient taken to the operating room and was placed in the prone position with a pillow under the abdomen.  All pressure points were well padded.  EKG, blood pressure, and pulse oximeter were monitored.  The lumbosacral  area was prepped with Chloraprep and draped in a sterile fashion. Under fluoroscopic guidance in an oblique dimension, the transverse process of the aforementioned vertebra at the junction of the body at 6 o'clock position on one side was identified. Skin and subcutaneous tissue was anesthetized with 1% lidocaine. A 22-gauge spinal needle was introduced under fluoroscopic guidance at the above junction into the foramen without parasthesias and into the epidural space. After confirming the position of the needle with PA, lateral, and oblique fluoroscopic views, aspiration was checked and was clear of blood or CSF.  Next, 1 mL of Omnipaque was injected. After seeing adequate spread on the corresponding nerve root, a total volume 2mL of injectate containing 0.5ml of the above mentioned local anesthetic, 1 ml saline,  and half of the above mentioned corticosteroid was injected into the epidural space.    The needle was removed intact.      Next, the same vertebral level and corresponding foramen on the contralateral side was visualized with fluoroscopy in an oblique view, and a similar approach was used to place the spinal needle in that foramen.  After confirming the position of the needle with PA, lateral, and oblique fluoroscopic views, aspiration was checked and was clear of blood or CSF.  Next, 1 mL of Omnipaque was injected. After seeing adequate spread on the corresponding nerve root, a total volume 2mL of injectate containing 0.5ml of the above mentioned local anesthetic, 1 ml saline, and half of the above mentioned corticosteroid was injected into the epidural space. The needle was removed intact.  Vital signs were stable throughout.      ESTIMATED BLOOD LOSS:  <5 mL  SPECIMENS:  none    COMPLICATIONS:   No complications were noted., There was no indication of vascular uptake on live injection of contrast dye., There was no indication of intrathecal uptake on live injection of contrast dye., There was not any  evidence of dural puncture.  , and The patient did not have any signs of postprocedure numbness nor weakness.    TOLERANCE & DISCHARGE CONDITION:    The patient tolerated the procedure well.  The patient was transported to the recovery area without difficulties.  The patient was discharged to home under the care of family in stable and satisfactory condition.    PLAN OF CARE:  The patient was given our standard instruction sheet.  The patient will Return to clinic 2-3 wks.  The patient will resume all medications as per the medication reconciliation sheet.

## 2023-08-03 RX ORDER — OMEPRAZOLE 40 MG/1
CAPSULE, DELAYED RELEASE ORAL
Qty: 180 CAPSULE | Refills: 3 | Status: SHIPPED | OUTPATIENT
Start: 2023-08-03

## 2023-08-29 DIAGNOSIS — M47.816 FACET SYNDROME, LUMBAR: ICD-10-CM

## 2023-08-30 RX ORDER — ETODOLAC 400 MG/1
TABLET, FILM COATED ORAL
Qty: 60 TABLET | Refills: 0 | Status: SHIPPED | OUTPATIENT
Start: 2023-08-30

## 2023-09-09 LAB
HOLD SPECIMEN: NORMAL
HOLD SPECIMEN: NORMAL
WHOLE BLOOD HOLD COAG: NORMAL
WHOLE BLOOD HOLD SPECIMEN: NORMAL

## 2023-09-09 PROCEDURE — 93005 ELECTROCARDIOGRAM TRACING: CPT

## 2023-09-09 PROCEDURE — 36415 COLL VENOUS BLD VENIPUNCTURE: CPT

## 2023-09-09 PROCEDURE — 80053 COMPREHEN METABOLIC PANEL: CPT | Performed by: EMERGENCY MEDICINE

## 2023-09-09 PROCEDURE — 84703 CHORIONIC GONADOTROPIN ASSAY: CPT | Performed by: EMERGENCY MEDICINE

## 2023-09-09 PROCEDURE — 84484 ASSAY OF TROPONIN QUANT: CPT | Performed by: EMERGENCY MEDICINE

## 2023-09-09 PROCEDURE — 85379 FIBRIN DEGRADATION QUANT: CPT | Performed by: EMERGENCY MEDICINE

## 2023-09-09 PROCEDURE — 99284 EMERGENCY DEPT VISIT MOD MDM: CPT

## 2023-09-09 PROCEDURE — 85025 COMPLETE CBC W/AUTO DIFF WBC: CPT | Performed by: EMERGENCY MEDICINE

## 2023-09-09 PROCEDURE — 83880 ASSAY OF NATRIURETIC PEPTIDE: CPT | Performed by: EMERGENCY MEDICINE

## 2023-09-09 RX ORDER — SODIUM CHLORIDE 0.9 % (FLUSH) 0.9 %
10 SYRINGE (ML) INJECTION AS NEEDED
Status: DISCONTINUED | OUTPATIENT
Start: 2023-09-09 | End: 2023-09-10 | Stop reason: HOSPADM

## 2023-09-10 ENCOUNTER — APPOINTMENT (OUTPATIENT)
Dept: GENERAL RADIOLOGY | Facility: HOSPITAL | Age: 18
End: 2023-09-10
Payer: COMMERCIAL

## 2023-09-10 ENCOUNTER — HOSPITAL ENCOUNTER (EMERGENCY)
Facility: HOSPITAL | Age: 18
Discharge: HOME | End: 2023-09-10
Attending: EMERGENCY MEDICINE | Admitting: EMERGENCY MEDICINE
Payer: COMMERCIAL

## 2023-09-10 VITALS
HEIGHT: 68 IN | BODY MASS INDEX: 40.92 KG/M2 | OXYGEN SATURATION: 95 % | HEART RATE: 110 BPM | WEIGHT: 270 LBS | SYSTOLIC BLOOD PRESSURE: 95 MMHG | DIASTOLIC BLOOD PRESSURE: 62 MMHG | TEMPERATURE: 99 F | RESPIRATION RATE: 18 BRPM

## 2023-09-10 DIAGNOSIS — M54.6 ACUTE LEFT-SIDED THORACIC BACK PAIN: Primary | ICD-10-CM

## 2023-09-10 LAB
ALBUMIN SERPL-MCNC: 4.5 G/DL (ref 3.5–5.2)
ALBUMIN/GLOB SERPL: 1.5 G/DL
ALP SERPL-CCNC: 62 U/L (ref 39–117)
ALT SERPL W P-5'-P-CCNC: 37 U/L (ref 1–33)
ANION GAP SERPL CALCULATED.3IONS-SCNC: 12 MMOL/L (ref 5–15)
AST SERPL-CCNC: 45 U/L (ref 1–32)
BASOPHILS # BLD AUTO: 0.06 10*3/MM3 (ref 0–0.2)
BASOPHILS NFR BLD AUTO: 0.7 % (ref 0–1.5)
BILIRUB SERPL-MCNC: 0.2 MG/DL (ref 0–1.2)
BUN SERPL-MCNC: 11 MG/DL (ref 6–20)
BUN/CREAT SERPL: 13.6 (ref 7–25)
CALCIUM SPEC-SCNC: 9.5 MG/DL (ref 8.6–10.5)
CHLORIDE SERPL-SCNC: 107 MMOL/L (ref 98–107)
CO2 SERPL-SCNC: 20 MMOL/L (ref 22–29)
CREAT SERPL-MCNC: 0.81 MG/DL (ref 0.57–1)
D DIMER PPP FEU-MCNC: 0.33 MCGFEU/ML (ref 0–0.5)
DEPRECATED RDW RBC AUTO: 45.8 FL (ref 37–54)
EGFRCR SERPLBLD CKD-EPI 2021: 106.7 ML/MIN/1.73
EOSINOPHIL # BLD AUTO: 0.36 10*3/MM3 (ref 0–0.4)
EOSINOPHIL NFR BLD AUTO: 4.4 % (ref 0.3–6.2)
ERYTHROCYTE [DISTWIDTH] IN BLOOD BY AUTOMATED COUNT: 14 % (ref 12.3–15.4)
GEN 5 2HR TROPONIN T REFLEX: <6 NG/L
GLOBULIN UR ELPH-MCNC: 3 GM/DL
GLUCOSE SERPL-MCNC: 111 MG/DL (ref 65–99)
HCG SERPL QL: NEGATIVE
HCT VFR BLD AUTO: 42.8 % (ref 34–46.6)
HGB BLD-MCNC: 13.9 G/DL (ref 12–15.9)
IMM GRANULOCYTES # BLD AUTO: 0.03 10*3/MM3 (ref 0–0.05)
IMM GRANULOCYTES NFR BLD AUTO: 0.4 % (ref 0–0.5)
LYMPHOCYTES # BLD AUTO: 4.3 10*3/MM3 (ref 0.7–3.1)
LYMPHOCYTES NFR BLD AUTO: 52.4 % (ref 19.6–45.3)
MCH RBC QN AUTO: 29.1 PG (ref 26.6–33)
MCHC RBC AUTO-ENTMCNC: 32.5 G/DL (ref 31.5–35.7)
MCV RBC AUTO: 89.5 FL (ref 79–97)
MONOCYTES # BLD AUTO: 0.62 10*3/MM3 (ref 0.1–0.9)
MONOCYTES NFR BLD AUTO: 7.6 % (ref 5–12)
NEUTROPHILS NFR BLD AUTO: 2.83 10*3/MM3 (ref 1.7–7)
NEUTROPHILS NFR BLD AUTO: 34.5 % (ref 42.7–76)
NRBC BLD AUTO-RTO: 0 /100 WBC (ref 0–0.2)
NT-PROBNP SERPL-MCNC: <36 PG/ML (ref 0–450)
PLATELET # BLD AUTO: 338 10*3/MM3 (ref 140–450)
PMV BLD AUTO: 10.4 FL (ref 6–12)
POTASSIUM SERPL-SCNC: 3.9 MMOL/L (ref 3.5–5.2)
PROT SERPL-MCNC: 7.5 G/DL (ref 6–8.5)
RBC # BLD AUTO: 4.78 10*6/MM3 (ref 3.77–5.28)
SODIUM SERPL-SCNC: 139 MMOL/L (ref 136–145)
TROPONIN T DELTA: NORMAL
TROPONIN T SERPL HS-MCNC: <6 NG/L
WBC NRBC COR # BLD: 8.2 10*3/MM3 (ref 3.4–10.8)

## 2023-09-10 PROCEDURE — 84484 ASSAY OF TROPONIN QUANT: CPT | Performed by: EMERGENCY MEDICINE

## 2023-09-10 PROCEDURE — 25010000002 KETOROLAC TROMETHAMINE PER 15 MG: Performed by: EMERGENCY MEDICINE

## 2023-09-10 PROCEDURE — 96374 THER/PROPH/DIAG INJ IV PUSH: CPT

## 2023-09-10 PROCEDURE — 71046 X-RAY EXAM CHEST 2 VIEWS: CPT

## 2023-09-10 RX ORDER — SODIUM CHLORIDE 0.9 % (FLUSH) 0.9 %
10 SYRINGE (ML) INJECTION AS NEEDED
Status: DISCONTINUED | OUTPATIENT
Start: 2023-09-10 | End: 2023-09-10 | Stop reason: HOSPADM

## 2023-09-10 RX ORDER — ACETAMINOPHEN 500 MG
1000 TABLET ORAL ONCE
Status: COMPLETED | OUTPATIENT
Start: 2023-09-10 | End: 2023-09-10

## 2023-09-10 RX ORDER — KETOROLAC TROMETHAMINE 15 MG/ML
15 INJECTION, SOLUTION INTRAMUSCULAR; INTRAVENOUS ONCE
Status: COMPLETED | OUTPATIENT
Start: 2023-09-10 | End: 2023-09-10

## 2023-09-10 RX ADMIN — KETOROLAC TROMETHAMINE 15 MG: 15 INJECTION, SOLUTION INTRAMUSCULAR; INTRAVENOUS at 00:54

## 2023-09-10 RX ADMIN — ACETAMINOPHEN 1000 MG: 500 TABLET ORAL at 00:46

## 2023-09-10 NOTE — ED NOTES
"Pt to ED from home via pv. Pt reports upper back pain that feels like it's \"starting under my shoulders\" beginning about 20 minutes ago. Pt reports pain is going down her L arm as well. Pt reports pain with breathing. Pt denies injury. Pt is Aox4 and ambulatory in triage.  "

## 2023-09-10 NOTE — DISCHARGE INSTRUCTIONS
As we discussed, take Tylenol and Motrin for pain.  Return if worsening of pain, fever, shortness of breath, any concerns.

## 2023-09-10 NOTE — ED PROVIDER NOTES
EMERGENCY DEPARTMENT ENCOUNTER    Room Number:  24/24  Date of encounter:  9/10/2023  PCP: Gerson Neville MD  Historian: Patient and mother  Relevant information and history provided by sources other than the patient will be included below and in the ED Course.  Review of pertinent past medical records may also be included in record below and ED Course.    HPI:  Chief Complaint: Back pain  A complete HPI/ROS/PMH/PSH/SH/FH are unobtainable due to: Not applicable  Context: Wendy Marks is a 18 y.o. female who presents to the ED c/o this is a patient who started having some back pain that is located at her left scapula and a little below that.  Described as sharp.  It is worse when she raises her left arm and moves her left arm as well as twisting of her thoracic spine and deep breathing.  Is important to note that she does have a long history of degenerative disc disease she has a history of T8 compression fraction.  And history of chronic lumbar pain.  She takes Lyrica for her pain.  She has never had a DVT or PE.  She does take birth control and she is obese which increases her risk factors for DVT.  No family history of clotting disorder.  She has no pain or swelling to her lower extremities.  She has no anterior chest pain, abdominal pain, headache.  She has no neurologic deficits such as weakness, numbness or tingling.  She does not smoke or drink.        Previous Episodes: Not entirely like this.  She does have a history of chronic back pain more in the lumbar region.  Current Symptoms: See above    MEDICAL HISTORY REVIEWED  History of degenerative disc disease chronic pain syndrome and lumbar radiculopathy.  She is on multiple medicines which I have reviewed.      PAST MEDICAL HISTORY  Active Ambulatory Problems     Diagnosis Date Noted    Migraine     IBS (irritable bowel syndrome)     Ankylosing spondylitis     Lumbar radiculopathy 04/13/2021    Insulin resistance 01/27/2022    Facet syndrome, lumbar  05/24/2022    DDD (degenerative disc disease), lumbar 08/17/2022    Chronic pain syndrome 08/17/2022    Muscle spasm 04/18/2023     Resolved Ambulatory Problems     Diagnosis Date Noted    Malpositioned IUD, sequela 03/04/2021    Encounter for IUD insertion 03/04/2021    Encounter for removal and reinsertion of intrauterine contraceptive device (IUD) 03/09/2021     Past Medical History:   Diagnosis Date    Anemia     Anxiety     Bipolar disorder     Chronic constipation     Chronic diarrhea     Chronic low back pain     Chronic pain disorder     Cluster headache     COVID-19     Depression     Endometriosis     Extremity pain     Gastroparesis     GERD (gastroesophageal reflux disease)     Headache, tension-type     History of anemia     Hyperlipidemia     Joint pain     Lumbosacral disc disease     Neck pain     PCOS (polycystic ovarian syndrome)     Polycystic ovary syndrome     PONV (postoperative nausea and vomiting)     TMJ dysfunction     Trigeminal neuralgia          PAST SURGICAL HISTORY  Past Surgical History:   Procedure Laterality Date    ABDOMINAL SURGERY      APPENDECTOMY      EPIDURAL Bilateral 04/21/2021    Procedure: LUMBAR/SACRAL TRANSFORAMINAL EPIDURAL - likely bilateral at L5;  Surgeon: Rhett Gutierrez MD;  Location: [facility];  Service: Pain Management;  Laterality: Bilateral;    EPIDURAL Bilateral 12/10/2021    Procedure: Bilateral L5 LUMBAR/SACRAL TRANSFORAMINAL EPIDURAL;  Surgeon: Rhett Gutierrez MD;  Location: [facility];  Service: Pain Management;  Laterality: Bilateral;    EPIDURAL Bilateral 07/20/2022    Procedure: Bilateral L5 LUMBAR/SACRAL TRANSFORAMINAL EPIDURAL;  Surgeon: Rhett Gutierrez MD;  Location: [facility];  Service: Pain Management;  Laterality: Bilateral;    EPIDURAL Bilateral 12/01/2022    Procedure: LUMBAR/SACRAL TRANSFORAMINAL EPIDURAL BILAT L5--THERAPEUTIC;  Surgeon: Rhett Gutierrez MD;  Location: [facility];  Service: Pain Management;  Laterality:  Bilateral;    EPIDURAL Bilateral 03/07/2023    Procedure: LUMBAR/SACRAL TRANSFORAMINAL EPIDURAL BILATERAL L5   [id number];  Surgeon: Rhett Gutierrez MD;  Location: [facility];  Service: Pain Management;  Laterality: Bilateral;    EPIDURAL Bilateral 6/6/2023    Procedure: LUMBAR/SACRAL TRANSFORAMINAL EPIDURAL BILATERAL L5 33527;  Surgeon: Rhett Gutierrez MD;  Location: [facility];  Service: Pain Management;  Laterality: Bilateral;    EPIDURAL BLOCK      FULGURATION ENDOMETRIOSIS      GYNECOLOGY EXAM UNDER ANESTHESIA N/A 03/09/2021    Procedure: GYNECOLOGY EXAM UNDER ANESTHESIAl, removal of Mirena intrauterine device and placement of Kyleena intrauterine device;  Surgeon: Josephine Cummins MD;  Location:  [facility];  Service: Obstetrics/Gynecology;  Laterality: N/A;  GYNECOLOGY EXAM UNDER ANESTHESIA  REMOVAL OF MIRENA INTRAUTERINE DEVICE AND PLACEMENT OF KYLEENA INTRAUTERINE DEVICE    MEDIAL BRANCH BLOCK Bilateral 06/09/2022    Procedure: Bilateral L4-S1 MEDIAL BRANCH BLOCK;  Surgeon: Rhett Gutierrez MD;  Location: [facility];  Service: Pain Management;  Laterality: Bilateral;    PELVIC LAPAROSCOPY  03/30/2023    dx [facility] endometriosis         FAMILY HISTORY  Family History   Problem Relation Age of Onset    Polycystic ovary syndrome Maternal Aunt     Hyperthyroidism Maternal Aunt     Liver disease Maternal Aunt     Irritable bowel syndrome Mother     Depression Mother     Arthritis Mother     GI problems Mother     Mental illness Mother     Migraines Mother     Diabetes Father     Pancreatitis Father     Mental illness Father     Hyperthyroidism Sister     Skin cancer Sister     Colon polyps Sister     Skin cancer Maternal Uncle     Diabetes Maternal Grandfather     Alzheimer's disease Maternal Grandfather     Diabetes Paternal Grandfather     Leukemia Paternal Grandfather     Breast cancer Neg Hx     Ovarian cancer Neg Hx     Colon cancer Neg Hx     Deep vein thrombosis Neg Hx     Pulmonary  embolism Neg Hx     Malig Hyperthermia Neg Hx          SOCIAL HISTORY  Social History     Socioeconomic History    Marital status: Single   Tobacco Use    Smoking status: Never    Smokeless tobacco: Never   Vaping Use    Vaping Use: Never used   Substance and Sexual Activity    Alcohol use: Never    Drug use: Not Currently     Types: Marijuana     Comment: 1-2 times monthly    Sexual activity: Never     Birth control/protection: I.U.D.     Comment: Kyleena IUD 3/2021         ALLERGIES  Adhesive tape and Latex        REVIEW OF SYSTEMS  Review of Systems     All systems reviewed and negative except for those discussed in HPI.       PHYSICAL EXAM    I have reviewed the triage vital signs and nursing notes.    ED Triage Vitals   Temp Heart Rate Resp BP SpO2   09/09/23 2109 09/09/23 2109 09/09/23 2119 09/09/23 2119 09/09/23 2109   99 °F (37.2 °C) 110 18 127/86 95 %      Temp src Heart Rate Source Patient Position BP Location FiO2 (%)   -- -- -- -- --              GENERAL: Female.  No acute distress.Vital signs on my initial evaluation have been reviewed unremarkable.  HENT: nares patent  Head/neck/ face are symmetric without gross deformity, signs of trauma, or swelling  EYES: no scleral icterus, no conjunctival pallor.  NECK: Supple, no meningismus  CV: regular rhythm, regular rate with intact distal pulses.  No murmur or rub  RESPIRATORY: normal effort and no respiratory distress.  Clear to auscultation bilaterally.  On back exam location of pain is right at the scapula and a little inferior on the left.  It is reproducible with deep breathing palpation and twisting and raising her left arm and moving her left arm across her body anteriorly.  Normal inspection with no rash.  ABDOMEN: soft and nontender.  Morbidly obese.  MUSCULOSKELETAL: no deformity.  No swelling, erythema or warmth to extremities.  Extremities appear symmetric.  Strong intact distal pulses to upper and lower extremities that are equal strong and  symmetric.  NEURO: alert and appropriate, moves all extremities, follows commands.  No focal motor or sensory changes  SKIN: warm, dry    Vital signs and nursing notes reviewed.        LAB RESULTS  Recent Results (from the past 24 hour(s))   Green Top (Gel)    Collection Time: 09/09/23  9:25 PM   Result Value Ref Range    Extra Tube Hold for add-ons.    Lavender Top    Collection Time: 09/09/23  9:25 PM   Result Value Ref Range    Extra Tube hold for add-on    Gold Top - SST    Collection Time: 09/09/23  9:25 PM   Result Value Ref Range    Extra Tube Hold for add-ons.    Light Blue Top    Collection Time: 09/09/23  9:25 PM   Result Value Ref Range    Extra Tube Hold for add-ons.    Comprehensive Metabolic Panel    Collection Time: 09/09/23  9:25 PM    Specimen: Blood   Result Value Ref Range    Glucose 111 (H) 65 - 99 mg/dL    BUN 11 6 - 20 mg/dL    Creatinine 0.81 0.57 - 1.00 mg/dL    Sodium 139 136 - 145 mmol/L    Potassium 3.9 3.5 - 5.2 mmol/L    Chloride 107 98 - 107 mmol/L    CO2 20.0 (L) 22.0 - 29.0 mmol/L    Calcium 9.5 8.6 - 10.5 mg/dL    Total Protein 7.5 6.0 - 8.5 g/dL    Albumin 4.5 3.5 - 5.2 g/dL    ALT (SGPT) 37 (H) 1 - 33 U/L    AST (SGOT) 45 (H) 1 - 32 U/L    Alkaline Phosphatase 62 39 - 117 U/L    Total Bilirubin 0.2 0.0 - 1.2 mg/dL    Globulin 3.0 gm/dL    A/G Ratio 1.5 g/dL    BUN/Creatinine Ratio 13.6 7.0 - 25.0    Anion Gap 12.0 5.0 - 15.0 mmol/L    eGFR 106.7 >60.0 mL/min/1.73   D-dimer, Quantitative    Collection Time: 09/09/23  9:25 PM    Specimen: Blood   Result Value Ref Range    D-Dimer, Quantitative 0.33 0.00 - 0.50 MCGFEU/mL   High Sensitivity Troponin T    Collection Time: 09/09/23  9:25 PM    Specimen: Blood   Result Value Ref Range    HS Troponin T <6 <10 ng/L   BNP    Collection Time: 09/09/23  9:25 PM    Specimen: Blood   Result Value Ref Range    proBNP <36.0 0.0 - 450.0 pg/mL   hCG, Serum, Qualitative    Collection Time: 09/09/23  9:25 PM    Specimen: Blood   Result Value Ref Range     HCG Qualitative Negative Negative   CBC Auto Differential    Collection Time: 09/09/23  9:25 PM    Specimen: Blood   Result Value Ref Range    WBC 8.20 3.40 - 10.80 10*3/mm3    RBC 4.78 3.77 - 5.28 10*6/mm3    Hemoglobin 13.9 12.0 - 15.9 g/dL    Hematocrit 42.8 34.0 - 46.6 %    MCV 89.5 79.0 - 97.0 fL    MCH 29.1 26.6 - 33.0 pg    MCHC 32.5 31.5 - 35.7 g/dL    RDW 14.0 12.3 - 15.4 %    RDW-SD 45.8 37.0 - 54.0 fl    MPV 10.4 6.0 - 12.0 fL    Platelets 338 140 - 450 10*3/mm3    Neutrophil % 34.5 (L) 42.7 - 76.0 %    Lymphocyte % 52.4 (H) 19.6 - 45.3 %    Monocyte % 7.6 5.0 - 12.0 %    Eosinophil % 4.4 0.3 - 6.2 %    Basophil % 0.7 0.0 - 1.5 %    Immature Grans % 0.4 0.0 - 0.5 %    Neutrophils, Absolute 2.83 1.70 - 7.00 10*3/mm3    Lymphocytes, Absolute 4.30 (H) 0.70 - 3.10 10*3/mm3    Monocytes, Absolute 0.62 0.10 - 0.90 10*3/mm3    Eosinophils, Absolute 0.36 0.00 - 0.40 10*3/mm3    Basophils, Absolute 0.06 0.00 - 0.20 10*3/mm3    Immature Grans, Absolute 0.03 0.00 - 0.05 10*3/mm3    nRBC 0.0 0.0 - 0.2 /100 WBC   High Sensitivity Troponin T 2Hr    Collection Time: 09/10/23 12:53 AM    Specimen: Arm, Right; Blood   Result Value Ref Range    HS Troponin T <6 <10 ng/L    Troponin T Delta         Ordered the above labs and independently reviewed the results.        RADIOLOGY  XR Chest 2 View    Result Date: 9/10/2023  Patient: RUDY WALTON  Time Out: 03:12 Exam(s): XR CXR 2 VIEWS EXAM:   XR Chest, 2 Views CLINICAL HISTORY:    Reason for exam: Pain in her left thoracic back at the lateral level of the scapula little below. It is sharp and pleuritic in nature. TECHNIQUE:   Frontal and lateral views of the chest. COMPARISON:   No relevant prior studies available. FINDINGS:   Lungs:  Unremarkable.  No consolidation.   Pleural space:  Unremarkable.  No pneumothorax.   Heart:  Unremarkable.  No cardiomegaly.   Mediastinum:  Unremarkable.   Bones joints:  Unremarkable.   Soft tissues:  External soft tissue artifact from  large body habitus. IMPRESSION:       No acute findings in the chest.     Electronically signed by Wero Fair MD on 09-10-23 at 0312     I ordered the above noted radiological studies. Reviewed by me and discussed with radiologist.  See dictation for official radiology interpretation.      PROCEDURES    Procedures      MEDICATIONS GIVEN IN ER    Medications   sodium chloride 0.9 % flush 10 mL (has no administration in time range)   sodium chloride 0.9 % flush 10 mL (has no administration in time range)   ketorolac (TORADOL) injection 15 mg (15 mg Intravenous Given 9/10/23 0054)   acetaminophen (TYLENOL) tablet 1,000 mg (1,000 mg Oral Given 9/10/23 0046)         All labs have been independently reviewed by me.  All radiology studies have been reviewed by me and I discussed with radiologist dictating the report when indicated below.  All EKG's independently viewed and interpreted by me.  Discussion below represents my analysis of pertinent findings related to patient's condition, differential diagnosis, treatment plan and final disposition.        PROGRESS, DATA ANALYSIS, CONSULTS, AND MEDICAL DECISION MAKING    This is a patient that is having pleuritic back pain that sounds musculoskeletal.  She has a long history of back pain in the past.  This is a little different than previous episodes.  I Ariana check a dimer.  I do have a low clinical sufficient she does have a PE but she is on hormones.  I will check a dimer if it is negative I do not think she needs a CT angiogram of the chest.  I will give her some medicine for pain.  Also check a chest x-ray.  All questions answered at this time.      ED Course as of 09/10/23 0325   Sun Sep 10, 2023   0018 My own independent rotation of the EKG that was done at 9:16 PM reveals a rate of 98 it is normal sinus rhythm there is some intraventricular conduction delay with normal axis  And appreciate any acute injury pattern  QT looks normal  I compared this to the previous  EKG that was done on 3/17/2022 and it looks fairly similar. [MM]   0059 D-Dimer, Quant: 0.33 [MM]   0100 CO2(!): 20.0  7 months ago patient's CO2 on previous lab work was 20.  Patient has a normal anion gap.  She has very likely hepatic steatosis has mild elevation of ALT and AST which is not relating to her acute symptoms that brought her here today. [MM]   0100 HS Troponin T: <6 [MM]   0100 proBNP: <36.0 [MM]   0100 HCG Qualitative: Negative [MM]   0315 I reviewed the chest x-ray results from the radiologist no acute disease appreciated. [MM]   0317 I informed the patient of the results of the test.  An x-ray. [MM]      ED Course User Index  [MM] Reji Shen MD       AS OF 03:25 EDT VITALS:    BP - 95/62  HR - 110  TEMP - 99 °F (37.2 °C)  02 SATS - 95%    SOCIAL DETERMINANTS OF HEALTH THAT IMPACT OR LIMIT CARE (For example..Homelessness,safe discharge, inability to obtain care, follow up, or prescriptions):      DIAGNOSIS  Final diagnoses:   Acute left-sided thoracic back pain: Musculoskeletal         DISPOSITION  DISCHARGE    Patient discharged in stable condition.    Reviewed implications of results, diagnosis, meds, responsibility to follow up, warning signs and symptoms of possible worsening, potential complications and reasons to return to ER, including worsening of symptoms, worsening of pain, shortness of breath, fever, any concerns..    Patient/Family voiced understanding of above instructions.    Discussed plan for discharge, as there is no emergent indication for admission. Pt/family is agreeable and understands need for follow up and repeat testing.  Pt is aware that discharge does not mean that nothing is wrong but it indicates no emergency is present that requires admission and they must continue care with follow-up as given below or physician of their choice.     FOLLOW-UP  Gerson Neville MD  [address] [address] [phone]    In 1 week  Return if pain  worsens, If symptoms worsen, shortness of breath, fever, any concerns         Medication List      No changes were made to your prescriptions during this visit.                 DICTATED UTILIZING DRAGON DICTATION    Note Disclaimer: At [facility], we believe that sharing information builds trust and better relationships. You are receiving this note because you recently visited [facility]. It is possible you will see health information before a provider has talked with you about it. This kind of information can be easy to misunderstand. To help you fully understand what it means for your health, we urge you to discuss this note with your provider.       Reji Shen MD  09/10/23 [id number]

## 2023-09-11 LAB
QT INTERVAL: 363 MS
QTC INTERVAL: 464 MS

## 2023-09-13 ENCOUNTER — TELEPHONE (OUTPATIENT)
Dept: PAIN MEDICINE | Facility: CLINIC | Age: 18
End: 2023-09-13
Payer: COMMERCIAL

## 2023-09-13 DIAGNOSIS — M54.16 LUMBAR RADICULOPATHY: ICD-10-CM

## 2023-09-13 DIAGNOSIS — M51.36 DDD (DEGENERATIVE DISC DISEASE), LUMBAR: ICD-10-CM

## 2023-09-13 RX ORDER — PREGABALIN 100 MG/1
100 CAPSULE ORAL 3 TIMES DAILY
Qty: 270 CAPSULE | Refills: 0 | Status: SHIPPED | OUTPATIENT
Start: 2023-09-13

## 2023-09-14 ENCOUNTER — OFFICE VISIT (OUTPATIENT)
Dept: PAIN MEDICINE | Facility: CLINIC | Age: 18
End: 2023-09-14
Payer: COMMERCIAL

## 2023-09-14 VITALS
WEIGHT: 270.8 LBS | BODY MASS INDEX: 41.04 KG/M2 | DIASTOLIC BLOOD PRESSURE: 90 MMHG | HEIGHT: 68 IN | HEART RATE: 101 BPM | TEMPERATURE: 96.9 F | RESPIRATION RATE: 18 BRPM | SYSTOLIC BLOOD PRESSURE: 108 MMHG | OXYGEN SATURATION: 97 %

## 2023-09-14 DIAGNOSIS — G89.4 CHRONIC PAIN SYNDROME: ICD-10-CM

## 2023-09-14 DIAGNOSIS — M45.9 ANKYLOSING SPONDYLITIS, UNSPECIFIED SITE OF SPINE: ICD-10-CM

## 2023-09-14 DIAGNOSIS — M54.16 LUMBAR RADICULOPATHY: ICD-10-CM

## 2023-09-14 DIAGNOSIS — M51.24 THORACIC DISC HERNIATION: Primary | ICD-10-CM

## 2023-09-14 DIAGNOSIS — M47.816 FACET SYNDROME, LUMBAR: ICD-10-CM

## 2023-09-14 DIAGNOSIS — M51.36 DDD (DEGENERATIVE DISC DISEASE), LUMBAR: ICD-10-CM

## 2023-09-14 PROCEDURE — 80305 DRUG TEST PRSMV DIR OPT OBS: CPT | Performed by: PHYSICIAN ASSISTANT

## 2023-09-14 PROCEDURE — 99214 OFFICE O/P EST MOD 30 MIN: CPT | Performed by: PHYSICIAN ASSISTANT

## 2023-09-14 NOTE — PROGRESS NOTES
CHIEF COMPLAINT  Back pain    Subjective   Wendy Marks is a 18 y.o. female  who presents to the office for follow-up of procedure.  She completed a LUMBAR/SACRAL TRANSFORAMINAL EPIDURAL BILATERAL [id number] [id number]    on  6/6/23 performed by Dr. Gutierrez for management of back pain. Patient reports 60-70% ongoing relief from the procedure.  Since her last office visit the patient is now having significant mid thoracic spine pain which radiates into the posterior aspect of the left scapular region and into the upper portion of the left arm terminating at the elbow with intermittent numbness and tingling.  The patient underwent thoracic MRI which was ordered by her rheumatologist which shows disc protrusion at T8.    Current medication regimen consists of pregabalin 100 mg p.o. 3 times daily and etodolac 400 mg p.o. twice daily which she is tolerating well without adverse effects.  The patient finds that these medications provide at least 50% improvement of her painful symptoms.    Pain today 6/10 VAS in severity.      Back Pain  This is a chronic problem. The current episode started more than 1 year ago. The problem occurs constantly. The problem is unchanged. The pain is present in the thoracic spine and lumbar spine. The quality of the pain is described as burning and shooting. Radiates to: Left shoulder blade and upper arm. The pain is at a severity of 6/10. The pain is moderate. The pain is The same all the time. The symptoms are aggravated by standing and position (Activity). Associated symptoms include numbness (left arm, bilateral legs). Pertinent negatives include no abdominal pain, dysuria, headaches or weakness.      PEG Assessment   What number best describes your pain on average in the past week?9  What number best describes how, during the past week, pain has interfered with your enjoyment of life?7  What number best describes how, during the past week, pain has interfered with your general activity?   7    Review of Pertinent Medical Data ---        Narrative & Impression   MRI OF THE LUMBAR SPINE WITHOUT CONTRAST 11/09/2022     CLINICAL HISTORY: Patient has low back pain, lumbar radiculopathy,  weakness in lower extremities.      TECHNIQUE: Sagittal T1, proton density and fat-suppressed T2-weighted  images were obtained of the lumbar spine.  In addition axial T2-weighted  images were obtained from L1 to S2 and thin cut axial T1-weighted images  were obtained from L1 to L3 then angled through the interspaces from L3  to S1.     FINDINGS: The distal thoracic cord and the conus is normal in signal  intensity.  The conus terminates at the superior body of the L1 lumbar  level which is normal.      At T12-L1 the disc space and facets are normal in appearance with no  canal or foraminal narrowing.      At L1-2 the disc space and facets are normal in appearance with no canal  or foraminal narrowing.     At L2-3 there is very minimal posterior disc bulge.  The facets are  normal.  There is prominent posterior epidural fat and is predominantly  the posterior right and left lateral epidural fat that wraps along the  margins of the thecal sac resulting in mild-to-moderate generalized  narrowing of the thecal sac with slight diminished spinal fluid bathing  the cauda equina at this level and there is no foraminal narrowing.      At L3-4 the disc space is normal.  There is mild facet overgrowth.   There is prominent epidural fat.  There is mild generalized narrowing of  the thecal sac.  There is no foraminal narrowing.      At L4-5 there is mild bilateral facet overgrowth.  There is posterior  protruding disc material that indents the ventral aspect of the thecal  sac that mildly narrows the thecal sac and may contact the ventral  aspect of the traversing L5 nerve roots.  There is no foraminal  narrowing.      At L5-S1 there is minimal if any facet overgrowth, a 2 mm retrolisthesis  of L5 with respect to S1.  There is a  small left posterior lateral  subarticular disc protrusion that abuts the ventral aspect of the  traversing left S1 nerve root but does not appear to displace or  compress it. There is no significant canal or lateral recess or right  foraminal narrowing.  There is mild left foraminal narrowing.     IMPRESSION:  1. The MRI images obtained are somewhat grainy and are decreased  resolution and this limits evaluation of the lumbar spine.  2. Patient has a somewhat small size lumbar spinal canal and has  prominent posterior epidural fat at several levels that contributes to  narrowing of the thecal sac.  3. At L2-3 there is a minimal posterior disc bulge and prominent  posterior and right and left lateral epidural fat that wraps along the  margins of the thecal sac and there is mild-to-moderate generalized  narrowing of the thecal sac and slight diminished spinal fluid bathing  the cauda equina at this level.  4. At L3-4 there is prominent posterior right and left lateral epidural  fat and mild generalized narrowing of the thecal sac.  5. At L4-5 there is mild bilateral facet overgrowth.  There is a  posterior disc protrusion that indents the ventral aspect of the thecal  sac that mildly narrows the thecal sac and abuts the ventral aspect of  the traversing L5 nerve roots.  6. At L5-S1 there is mild disc space narrowing and disc desiccation, 2-3  mm retrolisthesis of L5 with respect to S1 with uncovering of the  posterior inferior aspect of the L5-S1 disc space and there is a left  posterior lateral-subarticular small disc protrusion that abuts the  ventral aspect of the traversing left S1 nerve root but does not  displace it.  There is minimal spurring and bulging disc material into  the left foramen with mild left foraminal narrowing.  The remainder of  the lumbar spine MRI is unremarkable.     Caveat: Due to the graininess of the images and the limited evaluation  on this MRI of the lumbar spine, If the patient has a  "persistent lumbar  radiculopathy consider a myelogram and postmyelogram CT for a more  comprehensive assessment.     This report was finalized on 11/11/2022 8:16 AM by Dr. Dat Ferguson M.D.           The following portions of the patient's history were reviewed and updated as appropriate: allergies, current medications, past family history, past medical history, past social history, past surgical history, and problem list.    Review of Systems   Constitutional:  Negative for activity change and fatigue.   Gastrointestinal:  Negative for abdominal pain, constipation and diarrhea.   Genitourinary:  Negative for difficulty urinating and dysuria.   Musculoskeletal:  Positive for back pain.   Neurological:  Positive for numbness (left arm, bilateral legs). Negative for dizziness, weakness, light-headedness and headaches.   Psychiatric/Behavioral:  Positive for sleep disturbance. Negative for agitation and suicidal ideas. The patient is not nervous/anxious.    I have reviewed and confirmed the accuracy of the ROS as documented by the MA/LPN/RN GEOVANY Garcia   Vitals:    09/14/23 1406   BP: 108/90   BP Location: Left arm   Patient Position: Sitting   Cuff Size: Large Adult   Pulse: 101   Resp: 18   Temp: 96.9 °F (36.1 °C)   SpO2: 97%   Weight: 123 kg (270 lb 12.8 oz)   Height: 172.7 cm (68\")   PainSc:   6   PainLoc: Back         Objective   Physical Exam  Vitals and nursing note reviewed.   Constitutional:       Appearance: Normal appearance. She is obese.   HENT:      Head: Normocephalic.   Pulmonary:      Effort: Pulmonary effort is normal.   Musculoskeletal:      Thoracic back: Spasms and tenderness present.      Lumbar back: Tenderness present. Decreased range of motion.   Skin:     General: Skin is warm and dry.   Neurological:      General: No focal deficit present.      Mental Status: She is alert and oriented to person, place, and time.      Cranial Nerves: Cranial nerves 2-12 are intact.      Sensory: " Sensation is intact.      Motor: Motor function is intact.      Gait: Gait is intact.   Psychiatric:         Mood and Affect: Mood normal.         Behavior: Behavior normal.         Thought Content: Thought content normal.         Judgment: Judgment normal.           Assessment & Plan   Diagnoses and all orders for this visit:    1. Thoracic disc herniation (Primary)  -     Ambulatory Referral to Physical Therapy Evaluate and treat; Stretching (traction/TENS if applicable), ROM, Strengthening  -     Urine Drug Screen Confirmation - Urine, Clean Catch; Future  -     POC Urine Drug Screen, Triage        Wendy Marks reports a pain score of 6.  Given her pain assessment as noted, treatment options were discussed and the following options were decided upon as a follow-up plan to address the patient's pain: continuation of current treatment plan for pain, prescription for non-opiod analgesics, referral to Physical Therapy, and use of non-medical modalities (ice, heat, stretching and/or behavior modifications).      --- In light of the patient's new documented T8 distribution I recommend proceeding with a formalized course of physical therapy for further evaluation and treatment recommendations to be made.  Based on her response she may be a candidate in the future for TESI for persistent thoracic radiculitis.  --- The patient is soon to graduate from nursing school and will be relocating to [facility] where she will be looking to transfer her care to another interventional pain management practice.  This patient is leaving our practice and good standing and we will provide a 90-day prescription refill on her pregabalin and etodolac in order to help her transition to her new home.         Routine UDS in office today as part of monitoring requirements for controlled substances.  The specimen was viewed and the immunoassay result reviewed and is appropriately negative is pregabalin is not detected on our POCT.   This specimen will be sent to [facility] laboratory for confirmation.             SHARIF REPORT  As part of the patient's treatment plan, I am prescribing controlled substances. The patient has been made aware of appropriate use of such medications, including potential risk of somnolence, limited ability to drive and/or work safely, and the potential for dependence or overdose. It has also been made clear that these medications are for use by this patient only, without concomitant use of alcohol or other substances unless prescribed.     Patient has completed prescribing agreement detailing terms of continued prescribing of controlled substances, including monitoring SHARIF reports, urine drug screening, and pill counts if necessary. The patient is aware that inappropriate use will results in cessation of prescribing such medications.    As the clinician, I personally reviewed the SHARIF from 9/14/2023 while the patient was in the office today.    History and physical exam exhibit continued safe and appropriate use of controlled substances.       Dictated utilizing Dragon dictation.

## 2023-10-28 DIAGNOSIS — M62.838 MUSCLE SPASM: ICD-10-CM

## 2023-10-28 DIAGNOSIS — M47.816 FACET SYNDROME, LUMBAR: ICD-10-CM

## 2023-10-30 RX ORDER — ETODOLAC 400 MG/1
400 TABLET, FILM COATED ORAL 2 TIMES DAILY
Qty: 60 TABLET | Refills: 0 | Status: SHIPPED | OUTPATIENT
Start: 2023-10-30

## 2023-10-30 RX ORDER — TIZANIDINE 4 MG/1
4 TABLET ORAL DAILY PRN
Qty: 90 TABLET | Refills: 0 | Status: SHIPPED | OUTPATIENT
Start: 2023-10-30

## 2023-11-26 DIAGNOSIS — M47.816 FACET SYNDROME, LUMBAR: ICD-10-CM

## 2023-11-27 RX ORDER — ETODOLAC 400 MG/1
400 TABLET, FILM COATED ORAL 2 TIMES DAILY
Qty: 60 TABLET | Refills: 0 | Status: SHIPPED | OUTPATIENT
Start: 2023-11-27

## 2024-06-05 DIAGNOSIS — M47.816 FACET SYNDROME, LUMBAR: ICD-10-CM

## 2024-06-05 RX ORDER — ETODOLAC 400 MG/1
400 TABLET, FILM COATED ORAL 2 TIMES DAILY
Qty: 60 TABLET | Refills: 0 | OUTPATIENT
Start: 2024-06-05

## 2024-06-05 NOTE — TELEPHONE ENCOUNTER
I left a message on Ms. Marks's voicemail asking her to return my call. Per her last office note Ms. Marks was leaving our practice due to her moving out of town.

## (undated) DEVICE — SKIN PREP TRAY W/CHG: Brand: MEDLINE INDUSTRIES, INC.

## (undated) DEVICE — NDL SPINE 22G 5IN BLK

## (undated) DEVICE — EPIDURAL TRAY: Brand: MEDLINE INDUSTRIES, INC.

## (undated) DEVICE — GLV SURG TRIUMPH PF LTX 7.5 STRL

## (undated) DEVICE — Device: Brand: PORTEX

## (undated) DEVICE — CANN NASL O2 INF

## (undated) DEVICE — GLV SURG BIOGEL LTX PF 7 1/2

## (undated) DEVICE — LOU D & C HYSTEROSCOPY: Brand: MEDLINE INDUSTRIES, INC.